# Patient Record
Sex: FEMALE | Race: WHITE | Employment: FULL TIME | URBAN - NONMETROPOLITAN AREA
[De-identification: names, ages, dates, MRNs, and addresses within clinical notes are randomized per-mention and may not be internally consistent; named-entity substitution may affect disease eponyms.]

---

## 2017-11-16 ENCOUNTER — APPOINTMENT (OUTPATIENT)
Dept: GENERAL RADIOLOGY | Age: 27
End: 2017-11-16
Payer: COMMERCIAL

## 2017-11-16 ENCOUNTER — HOSPITAL ENCOUNTER (EMERGENCY)
Age: 27
Discharge: HOME | End: 2017-11-16
Attending: EMERGENCY MEDICINE
Payer: COMMERCIAL

## 2017-11-16 VITALS
HEART RATE: 101 BPM | TEMPERATURE: 98 F | OXYGEN SATURATION: 100 % | SYSTOLIC BLOOD PRESSURE: 135 MMHG | DIASTOLIC BLOOD PRESSURE: 81 MMHG | RESPIRATION RATE: 18 BRPM

## 2017-11-16 DIAGNOSIS — S29.012A RHOMBOID MUSCLE STRAIN, INITIAL ENCOUNTER: Primary | ICD-10-CM

## 2017-11-16 PROCEDURE — 99283 EMERGENCY DEPT VISIT LOW MDM: CPT

## 2017-11-16 PROCEDURE — 73030 X-RAY EXAM OF SHOULDER: CPT

## 2017-11-16 PROCEDURE — 6370000000 HC RX 637 (ALT 250 FOR IP): Performed by: EMERGENCY MEDICINE

## 2017-11-16 RX ORDER — CYCLOBENZAPRINE HCL 10 MG
10 TABLET ORAL ONCE
Status: COMPLETED | OUTPATIENT
Start: 2017-11-16 | End: 2017-11-16

## 2017-11-16 RX ORDER — ACETAMINOPHEN 325 MG/1
975 TABLET ORAL ONCE
Status: COMPLETED | OUTPATIENT
Start: 2017-11-16 | End: 2017-11-16

## 2017-11-16 RX ORDER — CYCLOBENZAPRINE HCL 10 MG
10 TABLET ORAL 3 TIMES DAILY PRN
Qty: 21 TABLET | Refills: 0 | Status: SHIPPED | OUTPATIENT
Start: 2017-11-16 | End: 2017-11-23

## 2017-11-16 RX ORDER — TRAMADOL HYDROCHLORIDE 50 MG/1
50 TABLET ORAL ONCE
Status: COMPLETED | OUTPATIENT
Start: 2017-11-16 | End: 2017-11-16

## 2017-11-16 RX ORDER — TRAMADOL HYDROCHLORIDE 50 MG/1
50 TABLET ORAL EVERY 6 HOURS PRN
Qty: 20 TABLET | Refills: 0 | Status: SHIPPED | OUTPATIENT
Start: 2017-11-16 | End: 2017-11-26

## 2017-11-16 RX ADMIN — CYCLOBENZAPRINE HYDROCHLORIDE 10 MG: 10 TABLET, FILM COATED ORAL at 20:49

## 2017-11-16 RX ADMIN — TRAMADOL HYDROCHLORIDE 50 MG: 50 TABLET, FILM COATED ORAL at 20:49

## 2017-11-16 RX ADMIN — ACETAMINOPHEN 975 MG: 325 TABLET ORAL at 20:49

## 2017-11-16 ASSESSMENT — PAIN DESCRIPTION - PAIN TYPE: TYPE: ACUTE PAIN

## 2017-11-16 ASSESSMENT — PAIN DESCRIPTION - LOCATION: LOCATION: SHOULDER

## 2017-11-16 ASSESSMENT — PAIN SCALES - GENERAL
PAINLEVEL_OUTOF10: 7
PAINLEVEL_OUTOF10: 7

## 2017-11-16 ASSESSMENT — PAIN DESCRIPTION - ORIENTATION: ORIENTATION: LEFT

## 2017-11-17 ASSESSMENT — ENCOUNTER SYMPTOMS
COUGH: 0
WHEEZING: 0
SHORTNESS OF BREATH: 0

## 2017-11-17 NOTE — ED PROVIDER NOTES
Perfecto on 11/16/2017 8:09 PM              Vitals:    Vitals:    11/16/17 2035   BP: 135/81   Pulse: 101   Resp: 18   Temp: 98 °F (36.7 °C)   SpO2: 100%       EMERGENCY DEPARTMENT COURSE:    She was given Tylenol, Flexeril, tramadol. Test results and plan of care discussed. FINAL IMPRESSION      1.  Rhomboid muscle strain, initial encounter        DISPOSITION/PLAN    DISPOSITION Decision to Discharge    PATIENT REFERRED TO:    your Workman's [employer] provider in [facility]    Schedule an appointment as soon as possible for a visit         DISCHARGE MEDICATIONS:    Discharge Medication List as of 11/16/2017  8:40 PM      START taking these medications    Details   cyclobenzaprine (FLEXERIL) 10 MG tablet Take 1 tablet by mouth 3 times daily as needed for Muscle spasms, Disp-21 tablet, R-0Print      traMADol (ULTRAM) 50 MG tablet Take 1 tablet by mouth every 6 hours as needed for Pain ., Disp-20 tablet, R-0Print                (Please note that portions of this note were completed with a voice recognition program.  Efforts were made to edit the dictations but occasionally words are mis-transcribed.)      Sundeep Ruiz MD  11/17/17 1362

## 2017-11-17 NOTE — ED NOTES
Patient to exam room 4 via triage with c/o left shoulder pain while at work. Patient denies an injury to the shoulder, but thinks it might be from overuse.       Jm Stringer RN  11/16/17 3830

## 2018-03-22 ENCOUNTER — HOSPITAL ENCOUNTER (EMERGENCY)
Age: 28
Discharge: HOME | End: 2018-03-22
Attending: NURSE PRACTITIONER
Payer: COMMERCIAL

## 2018-03-22 VITALS
OXYGEN SATURATION: 100 % | DIASTOLIC BLOOD PRESSURE: 94 MMHG | RESPIRATION RATE: 18 BRPM | WEIGHT: 230 LBS | TEMPERATURE: 98.4 F | HEIGHT: 66 IN | HEART RATE: 112 BPM | SYSTOLIC BLOOD PRESSURE: 138 MMHG | BODY MASS INDEX: 36.96 KG/M2

## 2018-03-22 DIAGNOSIS — M25.561 CHRONIC PAIN OF RIGHT KNEE: Primary | ICD-10-CM

## 2018-03-22 DIAGNOSIS — G89.29 CHRONIC PAIN OF RIGHT KNEE: Primary | ICD-10-CM

## 2018-03-22 PROCEDURE — 99212 OFFICE O/P EST SF 10 MIN: CPT

## 2018-03-22 PROCEDURE — 99213 OFFICE O/P EST LOW 20 MIN: CPT | Performed by: NURSE PRACTITIONER

## 2018-03-22 ASSESSMENT — PAIN DESCRIPTION - PAIN TYPE: TYPE: ACUTE PAIN

## 2018-03-22 ASSESSMENT — PAIN DESCRIPTION - DESCRIPTORS: DESCRIPTORS: DULL;SHOOTING

## 2018-03-22 ASSESSMENT — PAIN SCALES - GENERAL: PAINLEVEL_OUTOF10: 2

## 2018-03-22 ASSESSMENT — ENCOUNTER SYMPTOMS: COLOR CHANGE: 0

## 2018-03-22 ASSESSMENT — PAIN DESCRIPTION - FREQUENCY: FREQUENCY: CONTINUOUS

## 2018-03-22 ASSESSMENT — PAIN DESCRIPTION - ORIENTATION: ORIENTATION: RIGHT

## 2018-03-22 ASSESSMENT — PAIN DESCRIPTION - ONSET: ONSET: ON-GOING

## 2018-03-22 ASSESSMENT — PAIN DESCRIPTION - LOCATION: LOCATION: KNEE

## 2018-03-22 NOTE — ED NOTES
PT GIVEN DISCHARGE INSTRUCTIONS, VERBALIZES UNDERSTANDING. PT ASSESSMENT UNCHANGED, DISCHARGED IN STABLE CONDITION.         Prateek Tellez RN  03/22/18 4810

## 2018-05-07 ENCOUNTER — HOSPITAL ENCOUNTER (EMERGENCY)
Age: 28
Discharge: HOME | End: 2018-05-07
Payer: COMMERCIAL

## 2018-05-07 VITALS
HEIGHT: 66 IN | SYSTOLIC BLOOD PRESSURE: 161 MMHG | BODY MASS INDEX: 37.28 KG/M2 | TEMPERATURE: 97.9 F | RESPIRATION RATE: 16 BRPM | WEIGHT: 232 LBS | HEART RATE: 96 BPM | OXYGEN SATURATION: 100 % | DIASTOLIC BLOOD PRESSURE: 94 MMHG

## 2018-05-07 DIAGNOSIS — K52.9 GASTROENTERITIS: Primary | ICD-10-CM

## 2018-05-07 PROCEDURE — 6360000002 HC RX W HCPCS: Performed by: NURSE PRACTITIONER

## 2018-05-07 PROCEDURE — 99213 OFFICE O/P EST LOW 20 MIN: CPT | Performed by: NURSE PRACTITIONER

## 2018-05-07 PROCEDURE — 99212 OFFICE O/P EST SF 10 MIN: CPT

## 2018-05-07 RX ORDER — ONDANSETRON 4 MG/1
4 TABLET, ORALLY DISINTEGRATING ORAL EVERY 8 HOURS PRN
Qty: 6 TABLET | Refills: 0 | Status: SHIPPED | OUTPATIENT
Start: 2018-05-07 | End: 2019-09-28

## 2018-05-07 RX ORDER — ONDANSETRON 4 MG/1
4 TABLET, ORALLY DISINTEGRATING ORAL ONCE
Status: COMPLETED | OUTPATIENT
Start: 2018-05-07 | End: 2018-05-07

## 2018-05-07 RX ORDER — ONDANSETRON HYDROCHLORIDE 4 MG/5ML
4 SOLUTION ORAL ONCE
COMMUNITY
End: 2019-09-28

## 2018-05-07 RX ADMIN — ONDANSETRON 4 MG: 4 TABLET, ORALLY DISINTEGRATING ORAL at 18:32

## 2018-05-07 ASSESSMENT — ENCOUNTER SYMPTOMS
COLOR CHANGE: 0
COUGH: 0
DIARRHEA: 0
VOMITING: 1
ANAL BLEEDING: 0
NAUSEA: 1
ABDOMINAL PAIN: 0
RHINORRHEA: 0
SINUS PRESSURE: 0
SORE THROAT: 0
SHORTNESS OF BREATH: 0

## 2019-09-28 ENCOUNTER — HOSPITAL ENCOUNTER (EMERGENCY)
Age: 29
Discharge: HOME | End: 2019-09-28
Payer: MEDICARE

## 2019-09-28 VITALS
DIASTOLIC BLOOD PRESSURE: 94 MMHG | HEART RATE: 97 BPM | RESPIRATION RATE: 18 BRPM | SYSTOLIC BLOOD PRESSURE: 123 MMHG | TEMPERATURE: 98.6 F

## 2019-09-28 DIAGNOSIS — M77.11 RIGHT LATERAL EPICONDYLITIS: Primary | ICD-10-CM

## 2019-09-28 DIAGNOSIS — S66.912A MUSCLE STRAIN OF LEFT WRIST, INITIAL ENCOUNTER: ICD-10-CM

## 2019-09-28 DIAGNOSIS — S29.012A STRAIN OF RHOMBOID MUSCLE, INITIAL ENCOUNTER: ICD-10-CM

## 2019-09-28 PROCEDURE — 99212 OFFICE O/P EST SF 10 MIN: CPT

## 2019-09-28 PROCEDURE — 99213 OFFICE O/P EST LOW 20 MIN: CPT | Performed by: NURSE PRACTITIONER

## 2019-09-28 RX ORDER — PREDNISONE 20 MG/1
40 TABLET ORAL DAILY
Qty: 10 TABLET | Refills: 0 | Status: SHIPPED | OUTPATIENT
Start: 2019-09-28 | End: 2019-10-03

## 2019-09-28 ASSESSMENT — PAIN SCALES - GENERAL: PAINLEVEL_OUTOF10: 9

## 2019-09-28 ASSESSMENT — PAIN DESCRIPTION - FREQUENCY: FREQUENCY: CONTINUOUS

## 2019-09-28 ASSESSMENT — PAIN DESCRIPTION - ORIENTATION: ORIENTATION: RIGHT;LEFT

## 2019-09-28 ASSESSMENT — ENCOUNTER SYMPTOMS
VOMITING: 0
NAUSEA: 0

## 2019-09-28 ASSESSMENT — PAIN DESCRIPTION - PAIN TYPE: TYPE: ACUTE PAIN

## 2019-09-28 ASSESSMENT — PAIN DESCRIPTION - DESCRIPTORS: DESCRIPTORS: SORE;ACHING

## 2019-09-28 ASSESSMENT — PAIN DESCRIPTION - LOCATION: LOCATION: ARM

## 2019-10-12 ENCOUNTER — HOSPITAL ENCOUNTER (EMERGENCY)
Age: 29
Discharge: HOME | End: 2019-10-12
Payer: MEDICARE

## 2019-10-12 VITALS
RESPIRATION RATE: 18 BRPM | HEART RATE: 121 BPM | BODY MASS INDEX: 39.37 KG/M2 | SYSTOLIC BLOOD PRESSURE: 120 MMHG | WEIGHT: 245 LBS | DIASTOLIC BLOOD PRESSURE: 74 MMHG | HEIGHT: 66 IN | OXYGEN SATURATION: 97 % | TEMPERATURE: 101.2 F

## 2019-10-12 DIAGNOSIS — E86.0 DEHYDRATION: Primary | ICD-10-CM

## 2019-10-12 DIAGNOSIS — R51.9 ACUTE NONINTRACTABLE HEADACHE, UNSPECIFIED HEADACHE TYPE: ICD-10-CM

## 2019-10-12 DIAGNOSIS — J06.9 UPPER RESPIRATORY TRACT INFECTION, UNSPECIFIED TYPE: ICD-10-CM

## 2019-10-12 DIAGNOSIS — J01.00 ACUTE NON-RECURRENT MAXILLARY SINUSITIS: ICD-10-CM

## 2019-10-12 LAB
BASOPHILS # BLD: 0.6 %
BASOPHILS ABSOLUTE: 0.1 THOU/MM3 (ref 0–0.1)
BUN, WHOLE BLOOD: 4 MG/DL (ref 8–26)
CHLORIDE, WHOLE BLOOD: 95 MEQ/L (ref 98–109)
CREAT SERPL-MCNC: 0.8 MG/DL (ref 0.5–1.2)
EOSINOPHIL # BLD: 0.6 %
EOSINOPHILS ABSOLUTE: 0.1 THOU/MM3 (ref 0–0.4)
FLU A ANTIGEN: NEGATIVE
FLU B ANTIGEN: NEGATIVE
GFR, ESTIMATED: > 90 ML/MIN/1.73M2
GLUCOSE, WHOLE BLOOD: 102 MG/DL (ref 70–108)
HCT VFR BLD CALC: 40.8 % (ref 37–47)
HEMOGLOBIN: 14.7 GM/DL (ref 12–16)
IMMATURE GRANS (ABS): 0.05 THOU/MM3 (ref 0–0.07)
IMMATURE GRANULOCYTES: 0.4 %
LYMPHOCYTES # BLD: 11.3 %
LYMPHOCYTES ABSOLUTE: 1.5 THOU/MM3 (ref 1–4.8)
MCH RBC QN AUTO: 29.3 PG (ref 27–31)
MCHC RBC AUTO-ENTMCNC: 36 GM/DL (ref 33–37)
MCV RBC AUTO: 81 FL (ref 81–99)
MONOCYTES # BLD: 13 %
MONOCYTES ABSOLUTE: 1.7 THOU/MM3 (ref 0.4–1.3)
NUCLEATED RED BLOOD CELLS: 0 /100 WBC
PDW BLD-RTO: 12.8 % (ref 11.5–14.5)
PLATELET # BLD: 332 THOU/MM3 (ref 130–400)
PMV BLD AUTO: 10.2 FL (ref 7.4–10.4)
POTASSIUM, WHOLE BLOOD: 3.7 MEQ/L (ref 3.5–4.9)
RBC # BLD: 5.01 MILL/MM3 (ref 4.2–5.4)
SEG NEUTROPHILS: 74.1 %
SEGMENTED NEUTROPHILS ABSOLUTE COUNT: 9.7 THOU/MM3 (ref 1.8–7.7)
SODIUM BLD-SCNC: 133 MEQ/L (ref 138–146)
TOTAL CO2, WHOLE BLOOD: 26 MEQ/L (ref 23–33)
WBC # BLD: 13.1 THOU/MM3 (ref 4.8–10.8)

## 2019-10-12 PROCEDURE — 82947 ASSAY GLUCOSE BLOOD QUANT: CPT

## 2019-10-12 PROCEDURE — 85025 COMPLETE CBC W/AUTO DIFF WBC: CPT

## 2019-10-12 PROCEDURE — 87804 INFLUENZA ASSAY W/OPTIC: CPT

## 2019-10-12 PROCEDURE — 84520 ASSAY OF UREA NITROGEN: CPT

## 2019-10-12 PROCEDURE — 82565 ASSAY OF CREATININE: CPT

## 2019-10-12 PROCEDURE — 6370000000 HC RX 637 (ALT 250 FOR IP): Performed by: NURSE PRACTITIONER

## 2019-10-12 PROCEDURE — 36415 COLL VENOUS BLD VENIPUNCTURE: CPT

## 2019-10-12 PROCEDURE — 80051 ELECTROLYTE PANEL: CPT

## 2019-10-12 PROCEDURE — 99213 OFFICE O/P EST LOW 20 MIN: CPT

## 2019-10-12 PROCEDURE — 99213 OFFICE O/P EST LOW 20 MIN: CPT | Performed by: NURSE PRACTITIONER

## 2019-10-12 RX ORDER — ACETAMINOPHEN 325 MG/1
650 TABLET ORAL ONCE
Status: COMPLETED | OUTPATIENT
Start: 2019-10-12 | End: 2019-10-12

## 2019-10-12 RX ORDER — ONDANSETRON 4 MG/1
4 TABLET, ORALLY DISINTEGRATING ORAL EVERY 8 HOURS PRN
Qty: 4 TABLET | Refills: 0 | Status: SHIPPED | OUTPATIENT
Start: 2019-10-12 | End: 2019-10-16

## 2019-10-12 RX ORDER — AMOXICILLIN AND CLAVULANATE POTASSIUM 875; 125 MG/1; MG/1
1 TABLET, FILM COATED ORAL 2 TIMES DAILY
Qty: 20 TABLET | Refills: 0 | Status: SHIPPED | OUTPATIENT
Start: 2019-10-12 | End: 2019-10-22

## 2019-10-12 RX ORDER — KETOROLAC TROMETHAMINE 30 MG/ML
30 INJECTION, SOLUTION INTRAMUSCULAR; INTRAVENOUS ONCE
Status: DISCONTINUED | OUTPATIENT
Start: 2019-10-12 | End: 2019-10-12

## 2019-10-12 RX ADMIN — ACETAMINOPHEN 650 MG: 325 TABLET ORAL at 18:12

## 2019-10-12 ASSESSMENT — ENCOUNTER SYMPTOMS
SHORTNESS OF BREATH: 0
CHEST TIGHTNESS: 0
NAUSEA: 1
WHEEZING: 0
COLOR CHANGE: 0
BACK PAIN: 0
STRIDOR: 0
VOMITING: 1
COUGH: 0

## 2019-10-12 ASSESSMENT — PAIN DESCRIPTION - PROGRESSION: CLINICAL_PROGRESSION: NOT CHANGED

## 2019-10-12 ASSESSMENT — PAIN DESCRIPTION - PAIN TYPE: TYPE: ACUTE PAIN

## 2019-10-12 ASSESSMENT — PAIN - FUNCTIONAL ASSESSMENT: PAIN_FUNCTIONAL_ASSESSMENT: PREVENTS OR INTERFERES WITH ALL ACTIVE AND SOME PASSIVE ACTIVITIES

## 2019-10-12 ASSESSMENT — PAIN DESCRIPTION - ONSET: ONSET: ON-GOING

## 2019-10-12 ASSESSMENT — PAIN DESCRIPTION - LOCATION: LOCATION: HEAD

## 2019-10-12 ASSESSMENT — PAIN DESCRIPTION - DESCRIPTORS: DESCRIPTORS: HEADACHE;POUNDING

## 2019-10-12 ASSESSMENT — PAIN DESCRIPTION - FREQUENCY: FREQUENCY: CONTINUOUS

## 2019-10-12 ASSESSMENT — PAIN SCALES - GENERAL: PAINLEVEL_OUTOF10: 7

## 2021-05-16 ENCOUNTER — HOSPITAL ENCOUNTER (EMERGENCY)
Age: 31
Discharge: HOME | End: 2021-05-16
Attending: EMERGENCY MEDICINE
Payer: COMMERCIAL

## 2021-05-16 ENCOUNTER — APPOINTMENT (OUTPATIENT)
Dept: GENERAL RADIOLOGY | Age: 31
End: 2021-05-16
Payer: COMMERCIAL

## 2021-05-16 VITALS
SYSTOLIC BLOOD PRESSURE: 151 MMHG | DIASTOLIC BLOOD PRESSURE: 96 MMHG | OXYGEN SATURATION: 100 % | TEMPERATURE: 98 F | RESPIRATION RATE: 18 BRPM | HEART RATE: 83 BPM

## 2021-05-16 DIAGNOSIS — S83.91XA SPRAIN OF RIGHT KNEE, UNSPECIFIED LIGAMENT, INITIAL ENCOUNTER: Primary | ICD-10-CM

## 2021-05-16 DIAGNOSIS — S83.92XA SPRAIN OF LEFT KNEE, UNSPECIFIED LIGAMENT, INITIAL ENCOUNTER: ICD-10-CM

## 2021-05-16 PROCEDURE — 99282 EMERGENCY DEPT VISIT SF MDM: CPT

## 2021-05-16 PROCEDURE — 73564 X-RAY EXAM KNEE 4 OR MORE: CPT

## 2021-05-16 RX ORDER — IBUPROFEN 800 MG/1
800 TABLET ORAL EVERY 8 HOURS PRN
Qty: 30 TABLET | Refills: 0 | Status: SHIPPED | OUTPATIENT
Start: 2021-05-16 | End: 2021-10-26

## 2021-05-16 ASSESSMENT — ENCOUNTER SYMPTOMS
SINUS PRESSURE: 0
COUGH: 0
TROUBLE SWALLOWING: 0
DIARRHEA: 0
CONSTIPATION: 0
SORE THROAT: 0
WHEEZING: 0
CHEST TIGHTNESS: 0
VOICE CHANGE: 0
NAUSEA: 0
BACK PAIN: 0
SHORTNESS OF BREATH: 0
RHINORRHEA: 0
ABDOMINAL PAIN: 0
VOMITING: 0

## 2021-05-16 ASSESSMENT — PAIN DESCRIPTION - DESCRIPTORS: DESCRIPTORS: SHARP

## 2021-05-16 ASSESSMENT — PAIN DESCRIPTION - LOCATION: LOCATION: KNEE

## 2021-05-16 ASSESSMENT — PAIN DESCRIPTION - PAIN TYPE: TYPE: ACUTE PAIN

## 2021-05-16 NOTE — ED TRIAGE NOTES
Pt comes in through ED lobby. She states that at work at [employer] she jumped off a 4 ft ledge and landed on her feet. Since then she has had severe right knee pain. She has some pain in her left knee, but most the pain is in her right.

## 2021-05-16 NOTE — ED PROVIDER NOTES
PO) Take by mouthHistorical Med      MedroxyPROGESTERone Acetate (DEPO-PROVERA IM) Inject into the muscleHistorical Med      acetaminophen (APAP EXTRA STRENGTH) 500 MG tablet Take 2 tablets by mouth 4 times daily as needed for Pain or Fever, Disp-30 tablet, R-0             ALLERGIES    is allergic to norco [hydrocodone-acetaminophen], nsaids, and vicodin [hydrocodone-acetaminophen]. FAMILY HISTORY    She indicated that her mother is alive. She indicated that her father is alive. family history includes Diabetes in her father; Heart Disease in her father; High Blood Pressure in her father; Other in her mother. SOCIAL HISTORY     reports that she has quit smoking. Her smoking use included cigarettes. She has a 6.50 pack-year smoking history. She has never used smokeless tobacco. She reports current alcohol use. She reports that she does not use drugs. PHYSICAL EXAM      INITIAL VITALS: BP (!) 151/96   Pulse 83   Temp 98 °F (36.7 °C) (Oral)   Resp 18   SpO2 100%  Estimated body mass index is 39.54 kg/m² as calculated from the following:    Height as of 10/12/19: 5' 6\" (1.676 m). Weight as of 10/12/19: 245 lb (111.1 kg). Physical Exam  Vitals reviewed. Constitutional:       Appearance: She is well-developed. HENT:      Head: Normocephalic and atraumatic. Right Ear: External ear normal.      Left Ear: External ear normal.      Nose: Nose normal.   Eyes:      General: No scleral icterus. Conjunctiva/sclera: Conjunctivae normal.      Pupils: Pupils are equal, round, and reactive to light. Neck:      Thyroid: No thyromegaly. Vascular: No JVD. Cardiovascular:      Rate and Rhythm: Normal rate and regular rhythm. Heart sounds: No murmur heard. No friction rub. Pulmonary:      Effort: Pulmonary effort is normal.      Breath sounds: Normal breath sounds. No wheezing or rales. Chest:      Chest wall: No tenderness.    Abdominal:      General: Bowel sounds are normal. Palpations: Abdomen is soft. There is no mass. Tenderness: There is no abdominal tenderness. Musculoskeletal:         General: Tenderness (Right knee showed no deformity however there is a diffuse tenderness on the anterior medial and lateral aspect with positive Joshua test.  Left knee showed negative Joshua test however there is tenderness mild leg on the anterior and anterolateral) present. Cervical back: Normal range of motion and neck supple. Lymphadenopathy:      Cervical: No cervical adenopathy. Skin:     Findings: No rash. Neurological:      Mental Status: She is alert and oriented to person, place, and time. Psychiatric:         Behavior: Behavior is cooperative. MEDICAL DECISION MAKING    DIFFERENTIAL DIAGNOSIS:  Lateral knee sprain right more than the left fracture dislocation, history of knee ACL surgery right      DIAGNOSTIC RESULTS    RADIOLOGY:  I have reviewedradiologic plain film image(s). The plain films will be read or overread by the radiologist.  All other non-plain film images(s) such as CT, Ultrasound and MRI have been read by the radiologist.  XR KNEE RIGHT (MIN 4 VIEWS)   Final Result   1. No acute fracture or malalignment is demonstrated. However there is a small right knee joint effusion suggested. **This report has been created using voice recognition software. It may contain minor errors which are inherent in voice recognition technology. **      Final report electronically signed by Dr. Kathleen Pete on 5/16/2021 3:43 PM            Vitals:    Vitals:    05/16/21 1502   BP: (!) 151/96   Pulse: 83   Resp: 18   Temp: 98 °F (36.7 °C)   TempSrc: Oral   SpO2: 100%       EMERGENCY DEPARTMENT COURSE:    Medications - No data to display    The pt was seen and evaluated by me. Within the department, I observed the pt's vitalsigns to be within acceptable range. Radiological studies were performed, results were reviewed with the patient.  Within the department, the pt was treated with knee immobilizer and crutches. I observed the pt's condition to be hemodynamically stable during the duration of their stay. I explained my proposed course of treatment to the pt, and they were amenable to my decision. They were discharged home, and they will return to the ED if their symptoms become more severein nature, or otherwise change. Controlled Substances Monitoring:        CRITICAL CARE:   None. CONSULTS:  None    PROCEDURES:  None. FINAL IMPRESSION       1. Sprain of right knee, unspecified ligament, initial encounter    2. Sprain of left knee, unspecified ligament, initial encounter          DISPOSITION/PLAN  PATIENT REFERRED TO:  [place]  [address] [facility] [address]  [phone]  Schedule an appointment as soon as possible for a visit in 1 day      DISCHARGEMEDICATIONS:  Discharge Medication List as of 5/16/2021  4:19 PM      START taking these medications    Details   ibuprofen (IBU) 800 MG tablet Take 1 tablet by mouth every 8 hours as needed for Pain, Disp-30 tablet, R-0Print               (Please note that portions of this note were completed with a voice recognition program and electronically transcribed. Efforts were [facility] edit the dictations but occasionally words are mis-transcribed . The transcription may contain errors not detected in proofreading.   This transcription was electronically signed.)         05/16/21 5:59 PM      Krissy Bolaños MD      Emergency room physician           Krissy Bolaños MD  05/16/21 1800

## 2021-07-26 ENCOUNTER — HOSPITAL ENCOUNTER (OUTPATIENT)
Dept: GENERAL RADIOLOGY | Age: 31
Discharge: HOME | End: 2021-07-26
Payer: COMMERCIAL

## 2021-07-26 ENCOUNTER — HOSPITAL ENCOUNTER (OUTPATIENT)
Age: 31
Discharge: HOME | End: 2021-07-26
Payer: COMMERCIAL

## 2021-07-26 DIAGNOSIS — Z01.811 PRE-OP CHEST EXAM: ICD-10-CM

## 2021-07-26 DIAGNOSIS — S82.125A CLOSED NONDISPLACED FRACTURE OF LATERAL CONDYLE OF LEFT TIBIA, INITIAL ENCOUNTER: ICD-10-CM

## 2021-07-26 LAB
EKG ATRIAL RATE: 95 BPM
EKG P AXIS: 33 DEGREES
EKG P-R INTERVAL: 124 MS
EKG Q-T INTERVAL: 358 MS
EKG QRS DURATION: 78 MS
EKG QTC CALCULATION (BAZETT): 449 MS
EKG R AXIS: 13 DEGREES
EKG T AXIS: 34 DEGREES
EKG VENTRICULAR RATE: 95 BPM
ERYTHROCYTE [DISTWIDTH] IN BLOOD BY AUTOMATED COUNT: 12.1 % (ref 11.5–14.5)
ERYTHROCYTE [DISTWIDTH] IN BLOOD BY AUTOMATED COUNT: 38.6 FL (ref 35–45)
HCT VFR BLD CALC: 46.9 % (ref 37–47)
HEMOGLOBIN: 15.4 GM/DL (ref 12–16)
MCH RBC QN AUTO: 28.7 PG (ref 26–33)
MCHC RBC AUTO-ENTMCNC: 32.8 GM/DL (ref 32.2–35.5)
MCV RBC AUTO: 87.5 FL (ref 81–99)
PLATELET # BLD: 333 THOU/MM3 (ref 130–400)
PMV BLD AUTO: 10 FL (ref 9.4–12.4)
RBC # BLD: 5.36 MILL/MM3 (ref 4.2–5.4)
WBC # BLD: 8.1 THOU/MM3 (ref 4.8–10.8)

## 2021-07-26 PROCEDURE — 71046 X-RAY EXAM CHEST 2 VIEWS: CPT

## 2021-07-26 PROCEDURE — 93005 ELECTROCARDIOGRAM TRACING: CPT | Performed by: ORTHOPAEDIC SURGERY

## 2021-07-26 PROCEDURE — 85027 COMPLETE CBC AUTOMATED: CPT

## 2021-07-26 PROCEDURE — 80053 COMPREHEN METABOLIC PANEL: CPT

## 2021-07-26 PROCEDURE — 36415 COLL VENOUS BLD VENIPUNCTURE: CPT

## 2021-07-27 LAB
ALBUMIN SERPL-MCNC: 4.3 G/DL (ref 3.5–5.1)
ALP BLD-CCNC: 63 U/L (ref 38–126)
ALT SERPL-CCNC: 11 U/L (ref 11–66)
ANION GAP SERPL CALCULATED.3IONS-SCNC: 13 MEQ/L (ref 8–16)
AST SERPL-CCNC: 12 U/L (ref 5–40)
BILIRUB SERPL-MCNC: 0.4 MG/DL (ref 0.3–1.2)
BUN BLDV-MCNC: 10 MG/DL (ref 7–22)
CALCIUM SERPL-MCNC: 8.9 MG/DL (ref 8.5–10.5)
CHLORIDE BLD-SCNC: 103 MEQ/L (ref 98–111)
CO2: 23 MEQ/L (ref 23–33)
CREAT SERPL-MCNC: 0.8 MG/DL (ref 0.4–1.2)
GFR SERPL CREATININE-BSD FRML MDRD: 84 ML/MIN/1.73M2
GLUCOSE BLD-MCNC: 78 MG/DL (ref 70–108)
POTASSIUM SERPL-SCNC: 4 MEQ/L (ref 3.5–5.2)
SODIUM BLD-SCNC: 139 MEQ/L (ref 135–145)
TOTAL PROTEIN: 7.1 G/DL (ref 6.1–8)

## 2021-08-09 ENCOUNTER — HOSPITAL ENCOUNTER (OUTPATIENT)
Dept: PHYSICAL THERAPY | Age: 31
Setting detail: THERAPIES SERIES
Discharge: HOME | End: 2021-08-09
Payer: COMMERCIAL

## 2021-08-09 PROCEDURE — 97110 THERAPEUTIC EXERCISES: CPT

## 2021-08-09 PROCEDURE — 97162 PT EVAL MOD COMPLEX 30 MIN: CPT

## 2021-08-09 NOTE — PROGRESS NOTES
** PLEASE SIGN, DATE AND TIME CERTIFICATION BELOW AND RETURN TO [facility] OUTPATIENT REHABILITATION (FAX #: [phone]). ATTEST/CO-SIGN IF ACCESSING VIA INWidgetbox. THANK YOU.**    I certify that I have examined the patient below and determined that Physical Medicine and Rehabilitation service is necessary and that I approve the established plan of care for up to 90 days or as specifically noted. Attestation, signature or co-signature of physician indicates approval of certification requirements.    ________________________ ____________ __________  Physician Signature   Date   Time   [address] [facility]  PHYSICAL THERAPY  [x] EVALUATION  [] DAILY NOTE (LAND) [] DAILY NOTE (AQUATIC ) [] PROGRESS NOTE [] DISCHARGE NOTE    [] [address] [facility]   [x] [place] [address]    [] [facility]   [] [facility]    Date: 2021  Patient Name:  Merian Kayser  : 1990  MRN: [id number]  CSN: [id number]    Referring Practitioner Grecia Aguilar MD   Diagnosis Sprain of anterior cruciate ligament of right knee, initial encounter [S83.511A]  Other tear of medial meniscus, current injury, right knee, initial encounter [S83.241A]  Tear of articular cartilage of right knee, current, initial encounter Darling Temi    Treatment Diagnosis S83.241A, S83.511A, S83.31XA   Date of Evaluation 21   Additional Pertinent History R ACL reconstruction 2006 Dr. Amina Garzon. 2nd ACL reconstruction and meniscus repair on 21 by Dr. Radha Ogden.       Functional Outcome Measure Used LEFS    Functional Outcome Score eval score 3 (21)       Insurance: Primary: Payor: Estella Friedman /  /  / ,   Secondary:    Authorization Information: 12 visits, pays at 100%, - 10/01   Visit # 1, 1/10 for progress note   Visits Allowed: 12   Recertification Date:    Physician Follow-Up:    Physician Orders:    History of Present Illness:      SUBJECTIVE: May 16, 2021 at work  jumped off shelf with severe R knee pain, unable to walk with R LE, mom came to  from work and to ED,x-rays done L knee negative , discharged with hinged knee brace and crutches. Also had L knee pain later that day. Called [facility] and went to walk in clinic the next day, x-rays to both knees, did not seen any fractures. MRI requested for both knees from [facility], 1 week later had MRI's B knee, ACL and meniscus tear R knee, fracture of L knee but no surgery needed. R knee ACL reconstruction and meniscus repair on 8/5/21, home the same day with TROM brace. Patient is NWB x 6 weeks,     Social/Functional History and Current Status:  Medications and Allergies have been reviewed and are listed on Medical History Questionnaire. Lopez Scott lives with family in a single story home with stairs and a handrail to enter. Task Previous Current   ADLs  Independent Assistance Required   IADL's Independent Assistance Required   Ambulation Independent Modified Independent   Transfers Independent Modified Independent   Recreation Independent Dependent/Unable   Community Integration Independent Dependent/Unable   Driving Active  Active  unable to drive since surgery   Work Full-Time.   Occupation:  at [employer], up and down ladders, lifting, standing, walking, squatting  On medical leave until at leave 10/6/21       OBJECTIVE:  Pain R knee pain 100% of the day, high 10/10, average 5/10       Observation 2 ACE wraps, tan JOSE MARIA hose borrowed from family as discharge nurse forgot, 3 incision, tape sealing guaze so PT removed, no drainage noted on incisions   Posture fair        Range of Motion Hip: R hip flexion max assist SLR, hip flexion 60  Knee: knee R 15- 50 degrees, L knee 0- 125 degrees  Ankle: ankle DF 5 degrees B   Strength Right Lower Extremity:  Impaired - R hip and knee 3-/5, ankle 4-/5  Left Lower Extremity:   [place] 5/5 L knee   Coordination Impaired - slow toe tapping R   Sensation WFL   Bed Mobility report able to walk with normal gait pattern with no pain  2. Increase strength R LE to 4/5 to allow patient to go up and down steps reciprocally with no pain  3. I with HEP as prescribed to allow patient to walk x 30 minutes at grocery with no R knee pain    Patient Education:   [x]  HEP/Education Completed: Plan of Care, Goals, HEP of above exercises, also advised patient to contact MD as nurse \"forgot\" JOSE MARIA hose at discharge from  Hospital and using multiple ACE wraps and sealing gauze with excess tape   Medbridge Access Code:  []  No new Education completed  []  Reviewed Prior HEP      []  Patient verbalized and/or demonstrated understanding of education provided. []  Patient unable to verbalize and/or demonstrate understanding of education provided. Will continue education. [x]  Barriers to learning: none    PLAN:  Treatment Recommendations: Strengthening, Range of Motion, Balance Training, Gait Training, Stair Training, Home Exercise Program and Patient Education    [x]  Plan of care initiated. Plan to see patient 2 times per week for 12 weeks to address the treatment planned outlined above.   []  Continue with current plan of care  []  Modify plan of care as follows:    []  Hold pending physician visit  []  Discharge    Time In 1355   Time Out 1445   Timed Code Minutes: 25 min   Total Treatment Time: 50 min       Electronically Signed by: Arnoldo Noland, PT

## 2021-08-11 ENCOUNTER — HOSPITAL ENCOUNTER (OUTPATIENT)
Dept: PHYSICAL THERAPY | Age: 31
Setting detail: THERAPIES SERIES
Discharge: HOME | End: 2021-08-11
Payer: COMMERCIAL

## 2021-08-11 PROCEDURE — 97110 THERAPEUTIC EXERCISES: CPT

## 2021-08-11 NOTE — PROGRESS NOTES
[address] [facility]  PHYSICAL THERAPY  [] EVALUATION  [x] DAILY NOTE (LAND) [] DAILY NOTE (AQUATIC ) [] PROGRESS NOTE [] DISCHARGE NOTE    [] [address] [facility]   [x] [facility]    [] [facility]   [] Dixon [facility]    Date: 2021  Patient Name:  Sheran Boast  : 1990  MRN: [id number]  CSN: [id number]    Referring Practitioner Karolyn Parra MD   Diagnosis Sprain of anterior cruciate ligament of right knee, initial encounter [S83.511A]  Other tear of medial meniscus, current injury, right knee, initial encounter [S83.241A]  Tear of articular cartilage of right knee, current, initial encounter Kayleen Boland    Treatment Diagnosis S83.241A, S83.511A, S83.31XA   Date of Evaluation 21   Additional Pertinent History R ACL reconstruction 2006 Dr. Keyshawn Garrett. 2nd ACL reconstruction and meniscus repair on 21 by Dr. Cindy Starks. Functional Outcome Measure Used LEFS    Functional Outcome Score eval score 3 (21)       Insurance: Primary: Payor: Martha Mosquera /  /  / ,   Secondary:    Authorization Information: 12 visits, pays at 100%, - 10/01   Visit # 2, 2/10 for progress note   Visits Allowed: 12   Recertification Date:    Physician Follow-Up:    Physician Orders:    History of Present Illness:      SUBJECTIVE:  Pt reports pain today 1-2/10.       OBJECTIVE:    TREATMENT   Precautions: NWB R LE , no flexion more than 90 degrees until 21   Pain: 1-2/10 R knee    X in shaded column indicates activity completed today   Modalities Parameters/  Location  Notes                     Manual Therapy Time/Technique  Notes                     Exercise/Intervention   Notes   Heel slide with strap 10  x    Quad set 10 5 sec x AROM R knee 5- 60 degrees   DF belt stretch 5 15 sec x    Sitting EOB knee flexion 3 min  x    SLR 3 way 2x5  x Hip flex, abd          bike                                     Specific Interventions Next Treatment: NWB R ACL protocol , no flexion > 90 degrees  X 4 weeks until 9/5/21. Activity/Treatment Tolerance:  [x]  Patient tolerated treatment well  []  Patient limited by fatigue  []  Patient limited by pain   []  Patient limited by medical complications  []  Other:     Assessment: Pt tolerated session fairly well, increased pain noted with side lying SLR. Plan to include stationary bike next visit. GOALS:  Patient Goal: to get my knee back to normal    Short Term Goals:  Time Frame: 4 weeks  1. Increase AROM R hip flexion to 40, knee 0- 90 degrees to allow patient to report able to get left knee in/out of car without difficulty  2. Increase strength R LE to 3+/5 to allow patient to get right leg in/out of car and bed without using arms  3. I with HEP as prescribed to allow patient to report able to sleep x 4 hours without awakening due to R knee pain    Long Term Goals:  Time Frame: 12 weeks  1. Increase AROM R hip flexion to 60, knee 0- 125 degrees to allow patient to report able to walk with normal gait pattern with no pain  2. Increase strength R LE to 4/5 to allow patient to go up and down steps reciprocally with no pain  3. I with HEP as prescribed to allow patient to walk x 30 minutes at grocery with no R knee pain    Patient Education:   []  HEP/Education Completed: Plan of Care, Goals, HEP of above exercises, also advised patient to contact MD as nurse \"forgot\" JOSE MARIA hose at discharge from  Hospital and using multiple ACE wraps and sealing gauze with excess tape   Medbridge Access Code:  []  No new Education completed  [x]  Reviewed Prior HEP      [x]  Patient verbalized and/or demonstrated understanding of education provided. []  Patient unable to verbalize and/or demonstrate understanding of education provided. Will continue education.   [x]  Barriers to learning: none    PLAN:  Treatment Recommendations: Strengthening, Range of Motion, Balance Training, Gait Training, Stair Training, Home Exercise Program and Patient Education    []  Plan of care initiated. Plan to see patient 2 times per week for 12 weeks to address the treatment planned outlined above.   [x]  Continue with current plan of care  []  Modify plan of care as follows:    []  Hold pending physician visit  []  Discharge    Time In 1135   Time Out 1200   Timed Code Minutes: 25 min   Total Treatment Time: 25 min       Electronically Signed by: Derek Salazar, PTA [id number]

## 2021-08-16 ENCOUNTER — HOSPITAL ENCOUNTER (OUTPATIENT)
Dept: PHYSICAL THERAPY | Age: 31
Setting detail: THERAPIES SERIES
Discharge: HOME | End: 2021-08-16
Payer: COMMERCIAL

## 2021-08-16 PROCEDURE — 97110 THERAPEUTIC EXERCISES: CPT

## 2021-08-16 NOTE — PROGRESS NOTES
[address] [facility]  PHYSICAL THERAPY  [] EVALUATION  [x] DAILY NOTE (LAND) [] DAILY NOTE (AQUATIC ) [] PROGRESS NOTE [] DISCHARGE NOTE    [] [address] [place] [address]   [x] [place] [address]    [] [facility]   [] Terance Snellen    Date: 2021  Patient Name:  Fran Dolan  : 1990  MRN: [id number]  CSN: [id number]    Referring Practitioner Jaime Osman MD   Diagnosis Sprain of anterior cruciate ligament of right knee, initial encounter [Y35.941Z]  Other tear of medial meniscus, current injury, right knee, initial encounter [S83.241A]  Tear of articular cartilage of right knee, current, initial encounter Demarcus Wen    Treatment Diagnosis S83.241A, S83.511A, S83.31XA   Date of Evaluation 21   Additional Pertinent History R ACL reconstruction 2006 Dr. Reid Villagomez. 2nd ACL reconstruction and meniscus repair on 21 by Dr. Karissa Monte.       Functional Outcome Measure Used LEFS    Functional Outcome Score eval score 3 (21)       Insurance: Primary: Payor: Antonette Stephens /  /  / ,   Secondary:    Authorization Information: 12 visits, pays at 100%, - 10/01   Visit # 3, 3/10 for progress note   Visits Allowed: 12   Recertification Date:    Physician Follow-Up:    Physician Orders:    History of Present Illness:      SUBJECTIVE:  Patient reports ran out of pain meds this weekend, called MD and is awaiting call back      OBJECTIVE:    TREATMENT   Precautions: NWB R LE , no flexion more than 90 degrees until 21   Pain: 2/10 R knee    X in shaded column indicates activity completed today   Modalities Parameters/  Location  Notes                     Manual Therapy Time/Technique  Notes                     Exercise/Intervention   Notes   Heel slide with strap 15  x    Quad set 10 5 sec x AROM R knee 3- 70 degrees   DF belt stretch 5 15 sec x    Sitting EOB knee flexion 3 min  x    SLR 3 way 15 5 x Hip flex, abd          bike Specific Interventions Next Treatment: NWB R ACL protocol , no flexion > 90 degrees  X 4 weeks until 9/5/21. Activity/Treatment Tolerance:  [x]  Patient tolerated treatment well  []  Patient limited by fatigue  []  Patient limited by pain   []  Patient limited by medical complications  []  Other:     Assessment: progressed with exercises and hold, continues to progress well    GOALS:  Patient Goal: to get my knee back to normal    Short Term Goals:  Time Frame: 4 weeks  1. Increase AROM R hip flexion to 40, knee 0- 90 degrees to allow patient to report able to get left knee in/out of car without difficulty  2. Increase strength R LE to 3+/5 to allow patient to get right leg in/out of car and bed without using arms  3. I with HEP as prescribed to allow patient to report able to sleep x 4 hours without awakening due to R knee pain    Long Term Goals:  Time Frame: 12 weeks  1. Increase AROM R hip flexion to 60, knee 0- 125 degrees to allow patient to report able to walk with normal gait pattern with no pain  2. Increase strength R LE to 4/5 to allow patient to go up and down steps reciprocally with no pain  3. I with HEP as prescribed to allow patient to walk x 30 minutes at grocery with no R knee pain    Patient Education:    [x]  HEP/Education Completed: reviewed HEP reps with hold 5 seconds SLR flexion 15, side/stomach 10  []  No new Education completed  [x]  Reviewed Prior HEP      []  Patient verbalized and/or demonstrated understanding of education provided. []  Patient unable to verbalize and/or demonstrate understanding of education provided. Will continue education. [x]  Barriers to learning: none    PLAN:  Treatment Recommendations: Strengthening, Range of Motion, Balance Training, Gait Training, Stair Training, Home Exercise Program and Patient Education    []  Plan of care initiated.   Plan to see patient 2 times per week for 12 weeks to address the treatment planned outlined above.  [x]  Continue with current plan of care  []  Modify plan of care as follows:    []  Hold pending physician visit  []  Discharge    Time In 930   Time Out 1000   Timed Code Minutes: 30 min   Total Treatment Time: 30 min       Electronically Signed by: Timothy Robertson PT

## 2021-08-18 ENCOUNTER — HOSPITAL ENCOUNTER (OUTPATIENT)
Dept: PHYSICAL THERAPY | Age: 31
Setting detail: THERAPIES SERIES
Discharge: HOME | End: 2021-08-18
Payer: COMMERCIAL

## 2021-08-18 PROCEDURE — 97110 THERAPEUTIC EXERCISES: CPT

## 2021-08-18 NOTE — PROGRESS NOTES
[facility] [facility]  PHYSICAL THERAPY  [] EVALUATION  [x] DAILY NOTE (LAND) [] DAILY NOTE (AQUATIC ) [] PROGRESS NOTE [] DISCHARGE NOTE    [] 5 [facility]   [x] [facility]     [] [facility]   []     Date: 2021  Patient Name:  Barak Tirado  : 1990  MRN: [id number]  CSN: [id number]    Referring Practitioner Reginald Peña MD   Diagnosis Sprain of anterior cruciate ligament of right knee, initial encounter [S83.511A]  Other tear of medial meniscus, current injury, right knee, initial encounter [S83.241A]  Tear of articular cartilage of right knee, current, initial encounter Trevor Mcmahon    Treatment Diagnosis S83.241A, S83.511A, S83.31XA   Date of Evaluation 21   Additional Pertinent History R ACL reconstruction  Dr. Chandrakant Velasquez. 2nd ACL reconstruction and meniscus repair on 21 by Dr. Jasmyne Gillespie.       Functional Outcome Measure Used LEFS    Functional Outcome Score eval score 3 (21)       Insurance: Primary: Payor: Fadi Quick /  /  / ,   Secondary:    Authorization Information: 12 visits, pays at 100%, - 10/01   Visit # 4, 4/10 for progress note   Visits Allowed: 12   Recertification Date:    Physician Follow-Up:    Physician Orders:    History of Present Illness:      SUBJECTIVE:  Pain meds 3 x per day, doing well with exercises      OBJECTIVE:    TREATMENT   Precautions: NWB R LE , no flexion more than 90 degrees until 21   Pain: 3/10 R knee    X in shaded column indicates activity completed today   Modalities Parameters/  Location  Notes                     Manual Therapy Time/Technique  Notes                     Exercise/Intervention   Notes   Heel slide with strap 15  x    Quad set 10 5 sec x AROM R knee 0- 76 degrees   DF belt stretch 5 15 sec x    Sitting EOB knee flexion 3 min  x    SLR 3 way 15 5 x Hip flex, abd          Bike seat 2, min assist on/off step 3 minutes  x Specific Interventions Next Treatment: NWB R ACL protocol , no flexion > 90 degrees  X 4 weeks until 9/5/21. Activity/Treatment Tolerance:  [x]  Patient tolerated treatment well  []  Patient limited by fatigue  []  Patient limited by pain   []  Patient limited by medical complications  []  Other:     Assessment: progressed with exercises and hold, continues to progress well    GOALS:  Patient Goal: to get my knee back to normal    Short Term Goals:  Time Frame: 4 weeks  1. Increase AROM R hip flexion to 40, knee 0- 90 degrees to allow patient to report able to get left knee in/out of car without difficulty  2. Increase strength R LE to 3+/5 to allow patient to get right leg in/out of car and bed without using arms  3. I with HEP as prescribed to allow patient to report able to sleep x 4 hours without awakening due to R knee pain    Long Term Goals:  Time Frame: 12 weeks  1. Increase AROM R hip flexion to 60, knee 0- 125 degrees to allow patient to report able to walk with normal gait pattern with no pain  2. Increase strength R LE to 4/5 to allow patient to go up and down steps reciprocally with no pain  3. I with HEP as prescribed to allow patient to walk x 30 minutes at grocery with no R knee pain    Patient Education:    [x]  HEP/Education Completed: increased reps to 15 for HEP  []  No new Education completed  [x]  Reviewed Prior HEP      []  Patient verbalized and/or demonstrated understanding of education provided. []  Patient unable to verbalize and/or demonstrate understanding of education provided. Will continue education. [x]  Barriers to learning: none    PLAN:  Treatment Recommendations: Strengthening, Range of Motion, Balance Training, Gait Training, Stair Training, Home Exercise Program and Patient Education    []  Plan of care initiated. Plan to see patient 2 times per week for 12 weeks to address the treatment planned outlined above.   [x]  Continue with current plan of care  []  Modify plan of care as follows:    []  Hold pending physician visit  []  Discharge    Time In 930   Time Out 1000   Timed Code Minutes: 30 min   Total Treatment Time: 30 min       Electronically Signed by: Jh Pierre PT

## 2021-08-25 ENCOUNTER — HOSPITAL ENCOUNTER (OUTPATIENT)
Dept: PHYSICAL THERAPY | Age: 31
Setting detail: THERAPIES SERIES
Discharge: HOME | End: 2021-08-25
Payer: COMMERCIAL

## 2021-08-25 PROCEDURE — 97110 THERAPEUTIC EXERCISES: CPT

## 2021-08-25 NOTE — PROGRESS NOTES
[address] [facility]  PHYSICAL THERAPY  [] EVALUATION  [x] DAILY NOTE (LAND) [] DAILY NOTE (AQUATIC ) [] PROGRESS NOTE [] DISCHARGE NOTE    [] 5 [facility]   [x] [place] [address]    [] [facility]   [] Domingo Amabilantoinette    Date: 2021  Patient Name:  Mary Lou Franz  : 1990  MRN: [id number]  CSN: [id number]    Referring Practitioner Zohra Talley MD   Diagnosis Sprain of anterior cruciate ligament of right knee, initial encounter [O44.314W]  Other tear of medial meniscus, current injury, right knee, initial encounter [S83.241A]  Tear of articular cartilage of right knee, current, initial encounter Darshana Xiomy    Treatment Diagnosis S83.241A, S83.511A, S83.31XA   Date of Evaluation 21   Additional Pertinent History R ACL reconstruction  Dr. Sahil Cruz. 2nd ACL reconstruction and meniscus repair on 21 by Dr. Arline Elder. Functional Outcome Measure Used LEFS    Functional Outcome Score eval score 3 (21)       Insurance: Primary: Payor: Shania Blanc /  /  / ,   Secondary:    Authorization Information: 12 visits, pays at 100%, - 10/01   Visit # 5, 5/10 for progress note   Visits Allowed: 12   Recertification Date:    Physician Follow-Up:    Physician Orders:    History of Present Illness:      SUBJECTIVE:  Pt reports increased pain today, 3-4/10. Pt reports she fell Monday after PT, when trying to get into her house. Pt reports doctor wants her to have brace locked at 0 degree when sleeping to increase knee ext. Stitches removed Monday and happy with incision.     OBJECTIVE:  TREATMENT   Precautions: NWB R LE , no flexion more than 90 degrees until 21   Pain: 3/10 R knee    X in shaded column indicates activity completed today   Modalities Parameters/  Location  Notes                     Manual Therapy Time/Technique  Notes                     Exercise/Intervention   Notes   Heel slide with strap 15  x Quad set 10 5 sec x AROM R knee 0- 76 degrees   DF belt stretch 5 15 sec x    Sitting EOB knee flexion 3 min  x    SLR 3 way 15 5 x Hip flex, abd          Bike seat 2, min assist on/off step 4 minutes  x                                  Specific Interventions Next Treatment: NWB R ACL protocol , no flexion > 90 degrees  X 4 weeks until 9/5/21. Activity/Treatment Tolerance:  [x]  Patient tolerated treatment well  []  Patient limited by fatigue  []  Patient limited by pain   []  Patient limited by medical complications  []  Other:     Assessment: Pt fearful of falling again, discussed safety with steps/AD. No change in AROM this date. GOALS:  Patient Goal: to get my knee back to normal    Short Term Goals:  Time Frame: 4 weeks  1. Increase AROM R hip flexion to 40, knee 0- 90 degrees to allow patient to report able to get left knee in/out of car without difficulty  2. Increase strength R LE to 3+/5 to allow patient to get right leg in/out of car and bed without using arms  3. I with HEP as prescribed to allow patient to report able to sleep x 4 hours without awakening due to R knee pain    Long Term Goals:  Time Frame: 12 weeks  1. Increase AROM R hip flexion to 60, knee 0- 125 degrees to allow patient to report able to walk with normal gait pattern with no pain  2. Increase strength R LE to 4/5 to allow patient to go up and down steps reciprocally with no pain  3. I with HEP as prescribed to allow patient to walk x 30 minutes at grocery with no R knee pain    Patient Education:   [facility] []  HEP/Education Completed: increased reps to 15 for HEP  []  No new Education completed  [x]  Reviewed Prior HEP      []  Patient verbalized and/or demonstrated understanding of education provided. []  Patient unable to verbalize and/or demonstrate understanding of education provided. Will continue education.   [x]  Barriers to learning: none    PLAN:  Treatment Recommendations: Strengthening, Range of Motion, Balance Training, Gait Training, Stair Training, Home Exercise Program and Patient Education    []  Plan of care initiated. Plan to see patient 2 times per week for 12 weeks to address the treatment planned outlined above.   [x]  Continue with current plan of care  []  Modify plan of care as follows:    []  Hold pending physician visit  []  Discharge    Time In 905   Time Out 0940   Timed Code Minutes: 35 min   Total Treatment Time: 35 min       Electronically Signed by: Gamal Olivares, PTA [id number]

## 2021-08-27 ENCOUNTER — HOSPITAL ENCOUNTER (OUTPATIENT)
Dept: PHYSICAL THERAPY | Age: 31
Setting detail: THERAPIES SERIES
Discharge: HOME | End: 2021-08-27
Payer: COMMERCIAL

## 2021-08-27 PROCEDURE — 97110 THERAPEUTIC EXERCISES: CPT

## 2021-08-27 NOTE — PROGRESS NOTES
[address] [facility]  PHYSICAL THERAPY  [] EVALUATION  [x] DAILY NOTE (LAND) [] DAILY NOTE (AQUATIC ) [] PROGRESS NOTE [] DISCHARGE NOTE    [] [address] [facility]   [x] Joy [address]    [] [facility]   [] Daysi Schmid    Date: 2021  Patient Name:  Oma Rincon  : 1990  MRN: [id number]  CSN: [id number]    Referring Practitioner Stella Hoover MD   Diagnosis Sprain of anterior cruciate ligament of right knee, initial encounter [S83.511A]  Other tear of medial meniscus, current injury, right knee, initial encounter [S83.241A]  Tear of articular cartilage of right knee, current, initial encounter Robert Moctezuma    Treatment Diagnosis S83.241A, S83.511A, S83.31XA   Date of Evaluation 21   Additional Pertinent History R ACL reconstruction  Dr. Анна Powers. 2nd ACL reconstruction and meniscus repair on 21 by Dr. Colleen Valentin. Functional Outcome Measure Used LEFS    Functional Outcome Score eval score 3 (21)       Insurance: Primary: Payor: Regina Arteaga /  /  / ,   Secondary:    Authorization Information: 12 visits, pays at 100%, - 10/01   Visit # 6, 6/10 for progress note   Visits Allowed: 12   Recertification Date:    Physician Follow-Up:    Physician Orders:    History of Present Illness:      SUBJECTIVE:  Patient brought in op reports , reports still NWB until .   Reports told that she may need to have another surgery for knee with meniscus issue, per op reports did NOT have meniscus repair  OBJECTIVE:  TREATMENT   Precautions: NWB R LE  , no restriction on AROM, keep brace locked in extension at all times except with PT and HEP   Pain: 3/10 R knee    X in shaded column indicates activity completed today   Modalities Parameters/  Location  Notes                     Manual Therapy Time/Technique  Notes                     Exercise/Intervention   Notes   Heel slide with strap 20  x    Quad set 2 x 10 5 sec x AROM R knee 0- 85 degrees   DF belt stretch 5 15 sec x    Sitting EOB knee flexion 10 5 x    SLR 3 way 15 5 x Hip flex, abd          Bike seat 2, min assist on/off step 5 minutes  x                                  Specific Interventions Next Treatment: NWB R ACL protocol without meniscus repair , No restrictions with AROM, brace locked in 0 degrees at all times except with PT and HEP    Activity/Treatment Tolerance:  [x]  Patient tolerated treatment well  []  Patient limited by fatigue  []  Patient limited by pain   []  Patient limited by medical complications  []  Other:     Assessment: PT discussed needing to stay positive and work on [employer] , focus on rehab. Patient stating several times \"I just think of all the things I can't do if I have to have another surgery\" , PT did question if anti-depressant that MD recommend     GOALS:  Patient Goal: to get my knee back to normal    Short Term Goals:  Time Frame: 4 weeks  1. Increase AROM R hip flexion to 40, knee 0- 90 degrees to allow patient to report able to get left knee in/out of car without difficulty  2. Increase strength R LE to 3+/5 to allow patient to get right leg in/out of car and bed without using arms  3. I with HEP as prescribed to allow patient to report able to sleep x 4 hours without awakening due to R knee pain    Long Term Goals:  Time Frame: 12 weeks  1. Increase AROM R hip flexion to 60, knee 0- 125 degrees to allow patient to report able to walk with normal gait pattern with no pain  2. Increase strength R LE to 4/5 to allow patient to go up and down steps reciprocally with no pain  3. I with HEP as prescribed to allow patient to walk x 30 minutes at grocery with no R knee pain    Patient Education:    [x]  HEP/Education Completed: increased reps to 15 for HEP  []  No new Education completed  [x]  Reviewed Prior HEP      []  Patient verbalized and/or demonstrated understanding of education provided.   []  Patient unable to verbalize and/or demonstrate understanding of education provided. Will continue education. [x]  Barriers to learning: none    PLAN:  Treatment Recommendations: Strengthening, Range of Motion, Balance Training, Gait Training, Stair Training, Home Exercise Program and Patient Education    []  Plan of care initiated. Plan to see patient 2 times per week for 12 weeks to address the treatment planned outlined above.   [x]  Continue with current plan of care  []  Modify plan of care as follows:    []  Hold pending physician visit  []  Discharge    Time In 900   Time Out 0930   Timed Code Minutes: 30 min   Total Treatment Time: 30 min       Electronically Signed by: Bhakti Bacon PT

## 2021-08-30 ENCOUNTER — HOSPITAL ENCOUNTER (OUTPATIENT)
Dept: PHYSICAL THERAPY | Age: 31
Setting detail: THERAPIES SERIES
Discharge: HOME | End: 2021-08-30
Payer: COMMERCIAL

## 2021-08-30 PROCEDURE — 97110 THERAPEUTIC EXERCISES: CPT

## 2021-08-30 NOTE — PROGRESS NOTES
[address] [facility]  PHYSICAL THERAPY  [] EVALUATION  [x] DAILY NOTE (LAND) [] DAILY NOTE (AQUATIC ) [] PROGRESS NOTE [] DISCHARGE NOTE    [] 615 [facility]   [x] [place]     [] [facility]   [] [facility]    Date: 2021  Patient Name:  Marcio Cardona  : 1990  MRN: [id number]  CSN: [id number]    Referring Practitioner Bessie Perry MD   Diagnosis Sprain of anterior cruciate ligament of right knee, initial encounter [S83.511A]  Other tear of medial meniscus, current injury, right knee, initial encounter [S83.241A]  Tear of articular cartilage of right knee, current, initial encounter Yolanda Barron    Treatment Diagnosis S83.241A, S83.511A, S83.31XA   Date of Evaluation 21   Additional Pertinent History R ACL reconstruction  Dr. Elena Steele. 2nd ACL reconstruction and meniscus repair on 21 by Dr. Hasmukh Moyer. Functional Outcome Measure Used LEFS    Functional Outcome Score eval score 3 (21)       Insurance: Primary: Payor: Rebeca Rosas /  /  / ,   Secondary:    Authorization Information: 12 visits, pays at 100%, - 10/01   Visit # 7, 7/10 for progress note   Visits Allowed: 12   Recertification Date:    Physician Follow-Up:    Physician Orders:    History of Present Illness:      SUBJECTIVE:  Patient reports she has decreased her pain medication use to half pill during the day.   Pain today 2-3/10, mostly stiffness    OBJECTIVE:  TREATMENT   Precautions: NWB R LE  , no restriction on AROM, keep brace locked in extension at all times except with PT and HEP   Pain: 2-3/10 R knee    X in shaded column indicates activity completed today   Modalities Parameters/  Location  Notes                     Manual Therapy Time/Technique  Notes                     Exercise/Intervention   Notes   Heel slide with strap 20  x    Quad set 2 x 10 5 sec x AROM R knee 0- 86 degrees   DF belt stretch 5 15 sec x Sitting EOB knee flexion 15 5 x    SLR 3 way 15 5 x Hip flex, abd          Bike seat 2, min assist on/off step 5 minutes                                    Specific Interventions Next Treatment: NWB R ACL protocol without meniscus repair , No restrictions with AROM, brace locked in 0 degrees at all times except with PT and HEP    Activity/Treatment Tolerance:  [x]  Patient tolerated treatment well  []  Patient limited by fatigue  []  Patient limited by pain   []  Patient limited by medical complications  []  Other:     Assessment: pt progressing as tolerable, AROM remaining the same today, no increased pain/soreness after session. GOALS:  Patient Goal: to get my knee back to normal    Short Term Goals:  Time Frame: 4 weeks  1. Increase AROM R hip flexion to 40, knee 0- 90 degrees to allow patient to report able to get left knee in/out of car without difficulty  2. Increase strength R LE to 3+/5 to allow patient to get right leg in/out of car and bed without using arms  3. I with HEP as prescribed to allow patient to report able to sleep x 4 hours without awakening due to R knee pain    Long Term Goals:  Time Frame: 12 weeks  1. Increase AROM R hip flexion to 60, knee 0- 125 degrees to allow patient to report able to walk with normal gait pattern with no pain  2. Increase strength R LE to 4/5 to allow patient to go up and down steps reciprocally with no pain  3. I with HEP as prescribed to allow patient to walk x 30 minutes at grocery with no R knee pain    Patient Education:    [x]  HEP/Education Completed: increased reps to 15 for HEP  []  No new Education completed  [x]  Reviewed Prior HEP      []  Patient verbalized and/or demonstrated understanding of education provided. []  Patient unable to verbalize and/or demonstrate understanding of education provided. Will continue education.   [x]  Barriers to learning: none    PLAN:  Treatment Recommendations: Strengthening, Range of Motion, Balance Training, Gait Training, Stair Training, Home Exercise Program and Patient Education    []  Plan of care initiated. Plan to see patient 2 times per week for 12 weeks to address the treatment planned outlined above.   [x]  Continue with current plan of care  []  Modify plan of care as follows:    []  Hold pending physician visit  []  Discharge    Time In 900   Time Out 0934   Timed Code Minutes: 34 min   Total Treatment Time: 34 min       Electronically Signed by: Margaux Suresh, PTA [id number]

## 2021-09-01 ENCOUNTER — HOSPITAL ENCOUNTER (OUTPATIENT)
Dept: PHYSICAL THERAPY | Age: 31
Setting detail: THERAPIES SERIES
Discharge: HOME | End: 2021-09-01
Payer: COMMERCIAL

## 2021-09-01 PROCEDURE — 97110 THERAPEUTIC EXERCISES: CPT

## 2021-09-01 NOTE — PROGRESS NOTES
[address] [facility]  PHYSICAL THERAPY  [] EVALUATION  [x] DAILY NOTE (LAND) [] DAILY NOTE (AQUATIC ) [] PROGRESS NOTE [] DISCHARGE NOTE    [] [facility]   [x] Joy [address]    [] [facility]   [] Hedy Sullivan    Date: 2021  Patient Name:  Kandy Chou  : 1990  MRN: [id number]  CSN: [id number]    Referring Practitioner Annette Paredes MD   Diagnosis Sprain of anterior cruciate ligament of right knee, initial encounter [B93.794N]  Other tear of medial meniscus, current injury, right knee, initial encounter [S83.241A]  Tear of articular cartilage of right knee, current, initial encounter Alejo Ramos    Treatment Diagnosis S83.241A, S83.511A, S83.31XA   Date of Evaluation 21   Additional Pertinent History R ACL reconstruction  Dr. Jr Tavares. 2nd ACL reconstruction and meniscus repair on 21 by Dr. Arnoldo Rose. Functional Outcome Measure Used LEFS    Functional Outcome Score eval score 3 (21)       Insurance: Primary: Payor: Ania Dos Santos /  /  / ,   Secondary:    Authorization Information: 12 visits, pays at 100%, - 10/01   Visit # 8, 8/10 for progress note   Visits Allowed: 12   Recertification Date:    Physician Follow-Up:    Physician Orders:    History of Present Illness:      SUBJECTIVE:  Patient reports pain 1-2/10, did not take pain medication before session.     OBJECTIVE:  TREATMENT   Precautions: NWB R LE  , no restriction on AROM, keep brace locked in extension at all times except with PT and HEP   Pain: 1-2/10 R knee    X in shaded column indicates activity completed today   Modalities Parameters/  Location  Notes                     Manual Therapy Time/Technique  Notes                     Exercise/Intervention   Notes   Heel slide with strap 20  x    Quad set 2 x 10 5 sec x AROM R knee 0- 97 degrees, AAROM 100 degrees   DF belt stretch 5 15 sec x HEP   Sitting EOB knee flexion 15 5 x SLR 4 way 15 5 x Added hip add SL          Bike seat 2, min assist on/off step 5 minutes  x                                  Specific Interventions Next Treatment: NWB R ACL protocol without meniscus repair , No restrictions with AROM, brace locked in 0 degrees at all times except with PT and HEP    Activity/Treatment Tolerance:  [x]  Patient tolerated treatment well  []  Patient limited by fatigue  []  Patient limited by pain   []  Patient limited by medical complications  []  Other:     Assessment: pt progressing well, increased AROM knee flex this date, added hip add SL. GOALS:  Patient Goal: to get my knee back to normal    Short Term Goals:  Time Frame: 4 weeks  1. Increase AROM R hip flexion to 40, knee 0- 90 degrees to allow patient to report able to get left knee in/out of car without difficulty  2. Increase strength R LE to 3+/5 to allow patient to get right leg in/out of car and bed without using arms  3. I with HEP as prescribed to allow patient to report able to sleep x 4 hours without awakening due to R knee pain    Long Term Goals:  Time Frame: 12 weeks  1. Increase AROM R hip flexion to 60, knee 0- 125 degrees to allow patient to report able to walk with normal gait pattern with no pain  2. Increase strength R LE to 4/5 to allow patient to go up and down steps reciprocally with no pain  3. I with HEP as prescribed to allow patient to walk x 30 minutes at grocery with no R knee pain    Patient Education:    [x]  HEP/Education Completed: increased reps to 15 for HEP  []  No new Education completed  [x]  Reviewed Prior HEP      []  Patient verbalized and/or demonstrated understanding of education provided. []  Patient unable to verbalize and/or demonstrate understanding of education provided. Will continue education.   [x]  Barriers to learning: none    PLAN:  Treatment Recommendations: Strengthening, Range of Motion, Balance Training, Gait Training, Stair Training, Home Exercise Program and Patient Education    []  Plan of care initiated. Plan to see patient 2 times per week for 12 weeks to address the treatment planned outlined above.   [x]  Continue with current plan of care  []  Modify plan of care as follows:    []  Hold pending physician visit  []  Discharge    Time In 0900   Time Out 0933   Timed Code Minutes: 33 min   Total Treatment Time: 33 min       Electronically Signed by: Irina Barlow, PTA [id number]

## 2021-09-08 ENCOUNTER — HOSPITAL ENCOUNTER (OUTPATIENT)
Dept: PHYSICAL THERAPY | Age: 31
Setting detail: THERAPIES SERIES
Discharge: HOME | End: 2021-09-08
Payer: COMMERCIAL

## 2021-09-08 PROCEDURE — 97110 THERAPEUTIC EXERCISES: CPT

## 2021-09-08 NOTE — PROGRESS NOTES
[facility]  PHYSICAL THERAPY  [x] DAILY NOTE (LAND) [] DAILY NOTE (AQUATIC ) [] PROGRESS NOTE [] DISCHARGE NOTE    [] [facility]   [x] [place]     [] [facility]   [] Domingo Amabile    Date: 2021  Patient Name:  Mary Lou Franz  : 1990  MRN: [id number]  CSN: [id number]    Referring Practitioner Zohra Talley MD   Diagnosis Sprain of anterior cruciate ligament of right knee, initial encounter [S83.511A]  Other tear of medial meniscus, current injury, right knee, initial encounter [S83.241A]  Tear of articular cartilage of right knee, current, initial encounter Darshana Stauffer    Treatment Diagnosis S83.241A, S83.511A, S83.31XA   Date of Evaluation 21   Additional Pertinent History R ACL reconstruction  Dr. Sahil Cruz. 2nd ACL reconstruction and meniscus repair on 21 by Dr. Arline Elder. Functional Outcome Measure Used LEFS    Functional Outcome Score eval score 3 (21)       Insurance: Primary: Payor: Shania Blanc /  /  / ,   Secondary:    Authorization Information: 12 visits, pays at 100%, - 10/01   Visit # 9, 9/10 for progress note   Visits Allowed: 12   Recertification Date:    Physician Follow-Up:    Physician Orders:    History of Present Illness:      SUBJECTIVE:  Patient late to session today and reports pain 3-4/10 and is out of pain pills and is not sleeping well due to pain.     OBJECTIVE:  TREATMENT   Precautions: NWB R LE  , no restriction on AROM, keep brace locked in extension at all times except with PT and HEP   Pain: 1-2/10 R knee    X in shaded column indicates activity completed today   Modalities Parameters/  Location  Notes                     Manual Therapy Time/Technique  Notes                     Exercise/Intervention   Notes   Heel slide with strap 20  x    Quad set 1 x 15 5 sec x AROM R knee 0- 110 degrees   DF belt stretch 5 15 sec  HEP   Sitting EOB knee flexion 15 5     SLR 4 way 15 5 x Added hip add SL          Bike seat 2, min assist on/off step 5 minutes  x                                  Specific Interventions Next Treatment: NWB R ACL protocol without meniscus repair , No restrictions with AROM, brace locked in 0 degrees at all times except with PT and HEP    Activity/Treatment Tolerance:  [x]  Patient tolerated treatment well  []  Patient limited by fatigue  []  Patient limited by pain   []  Patient limited by medical complications  []  Other:     Assessment: Session slightly limited due to patient being late to session but did show improvement in knee flexion. GOALS:  Patient Goal: to get my knee back to normal    Short Term Goals:  Time Frame: 4 weeks  1. Increase AROM R hip flexion to 40, knee 0- 90 degrees to allow patient to report able to get left knee in/out of car without difficulty  2. Increase strength R LE to 3+/5 to allow patient to get right leg in/out of car and bed without using arms  3. I with HEP as prescribed to allow patient to report able to sleep x 4 hours without awakening due to R knee pain    Long Term Goals:  Time Frame: 12 weeks  1. Increase AROM R hip flexion to 60, knee 0- 125 degrees to allow patient to report able to walk with normal gait pattern with no pain  2. Increase strength R LE to 4/5 to allow patient to go up and down steps reciprocally with no pain  3. I with HEP as prescribed to allow patient to walk x 30 minutes at grocery with no R knee pain    Patient Education:    [x]  HEP/Education Completed: increased reps to 15 for HEP  []  No new Education completed  [x]  Reviewed Prior HEP      []  Patient verbalized and/or demonstrated understanding of education provided. []  Patient unable to verbalize and/or demonstrate understanding of education provided. Will continue education.   [x]  Barriers to learning: none    PLAN:  Treatment Recommendations: Strengthening, Range of Motion, Balance Training, Gait Training, Stair Training, Home Exercise Program and Patient Education    []  Plan of care initiated. Plan to see patient 2 times per week for 12 weeks to address the treatment planned outlined above.   [x]  Continue with current plan of care  []  Modify plan of care as follows:    []  Hold pending physician visit  []  Discharge    Time In 0943   Time Out 1002   Timed Code Minutes: 19 min   Total Treatment Time: 19 min       Electronically Signed by: Regla Monk PTA [id number]

## 2021-09-10 ENCOUNTER — HOSPITAL ENCOUNTER (OUTPATIENT)
Dept: PHYSICAL THERAPY | Age: 31
Setting detail: THERAPIES SERIES
Discharge: HOME | End: 2021-09-10
Payer: COMMERCIAL

## 2021-09-10 PROCEDURE — 97110 THERAPEUTIC EXERCISES: CPT

## 2021-09-10 NOTE — PROGRESS NOTES
** PLEASE SIGN, DATE AND TIME CERTIFICATION BELOW AND RETURN TO [facility] OUTPATIENT REHABILITATION (FAX #: [phone]). ATTEST/CO-SIGN IF ACCESSING VIA [employer] [employer]. THANK YOU.**    I certify that I have examined the patient below and determined that Physical Medicine and Rehabilitation service is necessary and that I approve the established plan of care for up to 90 days or as specifically noted. Attestation, signature or co-signature of physician indicates approval of certification requirements.    ________________________ ____________ __________  Physician Signature   Date   Time     [address] [facility]  PHYSICAL THERAPY  [] DAILY NOTE (LAND) [] DAILY NOTE (AQUATIC ) [x] PROGRESS NOTE [] DISCHARGE NOTE    [] [address] [facility]   [x] [address]    [] [facility]   [] [facility]    Date: 9/10/2021  Patient Name:  Nedra Cisse  : 1990  MRN: [id number]  CSN: [id number]    Referring Practitioner Teto Gandhi MD   Diagnosis Sprain of anterior cruciate ligament of right knee, initial encounter [M95.092J]  Other tear of medial meniscus, current injury, right knee, initial encounter [S83.241A]  Tear of articular cartilage of right knee, current, initial encounter Phijoseph Abdon    Treatment Diagnosis S83.241A, S83.511A, S83.31XA   Date of Evaluation 21   Additional Pertinent History R ACL reconstruction 2006 Dr. Shayla Josue. 2nd ACL reconstruction and meniscus repair on 21 by Dr. Krish Almanzar.       Functional Outcome Measure Used LEFS    Functional Outcome Score eval score 3 (21)       Insurance: Primary: Payor: Steven Asher /  /  / ,   Secondary:    Authorization Information: 12 visits, pays at 100%, - 10/01   Visit # 10, 10/10 for progress note   Visits Allowed: 12   Recertification Date: 40/15/67   Physician Follow-Up:    Physician Orders:    History of Present Illness:      SUBJECTIVE:  Patient reports taking last pain med on Wednesday, difficulty sleeping since then. Patient reports pain today 1/10. Is compliant with HEP 2 x per day. High pain levels 4/10    OBJECTIVE:  TREATMENT   Precautions: NWB R LE 9/20 , no restriction on AROM, keep brace locked in extension at all times except with PT and HEP   Pain: 1/10 R knee    X in shaded column indicates activity completed today   Modalities Parameters/  Location  Notes                     Manual Therapy Time/Technique  Notes                     Exercise/Intervention   Notes   Heel slide with strap 20  x    Quad set 1 x 15 5 sec x AROM R knee 0- 113 degrees   DF belt stretch 5 15 sec  HEP   Sitting EOB knee flexion 15 5     SLR 4 way 15 5 x Added hip add SL          Bike seat 2, min assist on/off step 5 minutes  x                                  Specific Interventions Next Treatment: NWB R ACL protocol without meniscus repair , No restrictions with AROM, brace locked in 0 degrees at all times except with PT and HEP    Activity/Treatment Tolerance:  [x]  Patient tolerated treatment well  []  Patient limited by fatigue  []  Patient limited by pain   []  Patient limited by medical complications  []  Other:     Assessment: patient is progressing well. Did advise patient to contact [address] [place] [address] to see if new c9 has been submitted as currently only has 2 more visits approved. PT will progress to WB/standing/protocol after released by MD for WB. Increased time with PN and increased reps. PT recommends 8 more weeks PT as patient has not yet been able to start WB and strengthening, gait training or balance exercises. GOALS:  Patient Goal: to get my knee back to normal    Short Term Goals:  Time Frame: 4 weeks  1. Increase AROM R hip flexion to 40, knee 0- 90 degrees to allow patient to report able to get left knee in/out of car without difficulty. MET R HIP FLEXION 60, KNEE 0- 113 DEGREES  2.  Increase strength R LE to 3+/5 to allow patient to get right leg in/out of car and bed without using arms. MET STRENGTH 3+/5  3. I with HEP as prescribed to allow patient to report able to sleep x 4 hours without awakening due to R knee pain. MET SLEEPING 4 HOURS THEN AWAKENING    Long Term Goals:  Time Frame: 12 weeks  1. Increase AROM R hip flexion to 60, knee 0- 125 degrees to allow patient to report able to walk with normal gait pattern with no pain. NOT MET- ONGOING, CONTINUE  2. Increase strength R LE to 4/5 to allow patient to go up and down steps reciprocally with no pain. NOT MET- ONGOING, CONTINUE  3. I with HEP as prescribed to allow patient to walk x 30 minutes at grocery with no R knee pain. NOT MET- ONGOING, CONTINUE    REVISED GOALS  Short Term Goals:  Time Frame: 4 weeks  1. Increase AROM R hip flexion to 70, knee 0- 120 degrees to allow patient to report able to walk in house without crutches as OK per MD  2. Increase strength R LE to 4-/5 to allow patient to return to driving when OK per MD  3. I with HEP as prescribed to allow patient to report able to sleep x 6 hours without awakening due to R knee pain. Long Term Goals:  Time Frame: 8 weeks  1. Increase AROM R hip flexion to 60, knee 0- 125 degrees to allow patient to report able to walk with normal gait pattern with no pain. 2. Increase strength R LE to 4/5 to allow patient to go up and down steps reciprocally with no pain. 3. I with HEP as prescribed to allow patient to walk x 30 minutes at grocery with no R knee pain. Patient Education:    [x]  HEP/Education Completed: increased reps to 25 for HEP  []  No new Education completed  [x]  Reviewed Prior HEP      []  Patient verbalized and/or demonstrated understanding of education provided. []  Patient unable to verbalize and/or demonstrate understanding of education provided. Will continue education.   [x]  Barriers to learning: none    PLAN:  Treatment Recommendations: Strengthening, Range of Motion, Balance Training, Gait Training, Stair Training, Home Exercise Program and Patient Education   Plan to see patient 2 times per week for 8weeks to address the treatment planned outlined above.   [x]  Continue with current plan of care  []  Modify plan of care as follows:    []  Hold pending physician visit  []  Discharge    Time In 1030   Time Out 1110   Timed Code Minutes: 40 min   Total Treatment Time: 40 min       Electronically Signed by: Arnoldo Noland, PT

## 2021-09-13 ENCOUNTER — HOSPITAL ENCOUNTER (OUTPATIENT)
Dept: PHYSICAL THERAPY | Age: 31
Setting detail: THERAPIES SERIES
Discharge: HOME | End: 2021-09-13
Payer: COMMERCIAL

## 2021-09-13 PROCEDURE — 97110 THERAPEUTIC EXERCISES: CPT

## 2021-09-13 NOTE — PROGRESS NOTES
[address] [facility]  PHYSICAL THERAPY  [x] DAILY NOTE (LAND) [] DAILY NOTE (AQUATIC ) [] PROGRESS NOTE [] DISCHARGE NOTE    [] [facility]   [x] [address]    [] [facility]   [] Shakila Lights    Date: 2021  Patient Name:  Elyse Arteaga  : 1990  MRN: [id number]  CSN: [id number]    Referring Practitioner Louise Rust MD   Diagnosis Sprain of anterior cruciate ligament of right knee, initial encounter [S83.511A]  Other tear of medial meniscus, current injury, right knee, initial encounter [S83.241A]  Tear of articular cartilage of right knee, current, initial encounter Oscarelio Isatu    Treatment Diagnosis S83.241A, S83.511A, S83.31XA   Date of Evaluation 21   Additional Pertinent History R ACL reconstruction  Dr. Jessica Claudio. 2nd ACL reconstruction and meniscus repair on 21 by Dr. Julisa Miller. Functional Outcome Measure Used LEFS    Functional Outcome Score eval score 3 (21)       Insurance: Primary: Payor: [employer] /  /  / ,   Secondary:    Authorization Information: 12 visits, pays at 100%, - 10/01   Visit # 6,  for progress note   Visits Allowed: 12   Recertification Date:    Physician Follow-Up:    Physician Orders:    History of Present Illness:      SUBJECTIVE:  Patient reports only taking tylenol for pain. Has difficulty at night falling asleep. Has been doing 20 reps at home for HEP, x2 daily.      OBJECTIVE:  TREATMENT   Precautions: NWB R LE  , no restriction on AROM, keep brace locked in extension at all times except with PT and HEP   Pain: 2/10 R knee    X in shaded column indicates activity completed today   Modalities Parameters/  Location  Notes                     Manual Therapy Time/Technique  Notes                     Exercise/Intervention   Notes   Heel slide with strap 20  x    Quad set 1 x 15 5 sec x AROM R knee 0- 110 degrees   DF belt stretch 5 15 sec  HEP Sitting EOB knee flexion 15 5     SLR 4 way 20 5 x           Bike seat 2, min assist on/off step 5 minutes  x                                  Specific Interventions Next Treatment: NWB R ACL protocol without meniscus repair , No restrictions with AROM, brace locked in 0 degrees at all times except with PT and HEP    Activity/Treatment Tolerance:  [x]  Patient tolerated treatment well  []  Patient limited by fatigue  []  Patient limited by pain   []  Patient limited by medical complications  []  Other:     Assessment: patient is progressing well, pt reports she is going to call [facility] today to see if they received additional visits. Increased tightness today, loosing 3 degrees of knee flexion. Discussed continued use of ice and scar tissue massaging. GOALS:  Patient Goal: to get my knee back to normal    Short Term Goals:  Time Frame: 4 weeks  1. Increase AROM R hip flexion to 40, knee 0- 90 degrees to allow patient to report able to get left knee in/out of car without difficulty. MET R HIP FLEXION 60, KNEE 0- 113 DEGREES  2. Increase strength R LE to 3+/5 to allow patient to get right leg in/out of car and bed without using arms. MET STRENGTH 3+/5  3. I with HEP as prescribed to allow patient to report able to sleep x 4 hours without awakening due to R knee pain. MET SLEEPING 4 HOURS THEN AWAKENING    Long Term Goals:  Time Frame: 12 weeks  1. Increase AROM R hip flexion to 60, knee 0- 125 degrees to allow patient to report able to walk with normal gait pattern with no pain. NOT MET- ONGOING, CONTINUE  2. Increase strength R LE to 4/5 to allow patient to go up and down steps reciprocally with no pain. NOT MET- ONGOING, CONTINUE  3. I with HEP as prescribed to allow patient to walk x 30 minutes at grocery with no R knee pain. NOT MET- ONGOING, CONTINUE    REVISED GOALS  Short Term Goals:  Time Frame: 4 weeks  1.  Increase AROM R hip flexion to 70, knee 0- 120 degrees to allow patient to report able to

## 2021-09-15 ENCOUNTER — HOSPITAL ENCOUNTER (OUTPATIENT)
Dept: PHYSICAL THERAPY | Age: 31
Setting detail: THERAPIES SERIES
End: 2021-09-15
Payer: COMMERCIAL

## 2021-09-22 ENCOUNTER — HOSPITAL ENCOUNTER (OUTPATIENT)
Dept: PHYSICAL THERAPY | Age: 31
Setting detail: THERAPIES SERIES
Discharge: HOME | End: 2021-09-22
Payer: COMMERCIAL

## 2021-09-22 PROCEDURE — 97110 THERAPEUTIC EXERCISES: CPT

## 2021-09-22 NOTE — PROGRESS NOTES
[address] [facility]  PHYSICAL THERAPY  [x] DAILY NOTE (LAND) [] DAILY NOTE (AQUATIC ) [] PROGRESS NOTE [] DISCHARGE NOTE    [] [facility]   [x] [address]    [] [facility][1]   [] Wilder Rodriguez    Date: 2021  Patient Name:  Judy Montano  : 1990  MRN: [id number]  CSN: [id number]    Referring Practitioner Armando Aguilar MD   Diagnosis No admission diagnoses are documented for this encounter. Treatment Diagnosis S83.241A, [id number], S83.31XA   Date of Evaluation 21   Additional Pertinent History R ACL reconstruction  Dr. Janusz Mulligan. 2nd ACL reconstruction and meniscus repair on 21 by Dr. Lauren George. Functional Outcome Measure Used LEFS    Functional Outcome Score eval score 3 (21)       Insurance: Primary: Payor: Chris Low /  /  / ,   Secondary:    Authorization Information: 12 visits, pays at 100%, - 10/01   Visit # 11, 11 for progress note   Visits Allowed: 12   Recertification Date:    Physician Follow-Up: 2021   Physician Orders:    History of Present Illness:      SUBJECTIVE:  Patient reports decreased pain today 0-1/10. Pt had doc appt . New WB protocol. Pt scheduled for 8 more visits. OBJECTIVE:  TREATMENT   Precautions: PWB 50% with crutches 4 weeks from , FWB 2 weeks.    Pain: 0-1/10 R knee    X in shaded column indicates activity completed today   Modalities Parameters/  Location  Notes                     Manual Therapy Time/Technique  Notes                     Exercise/Intervention   Notes   Heel slide with strap 20  x    Quad set 1 x 15 5 sec x AROM R knee 0- 115 degrees   DF belt stretch 5 15 sec x    Sitting EOB knee flexion 15 5     SLR 4 way 20 5 x           Bike seat 2 5 minutes  x Full revolutions fwd   Total gym Heel raises 50% PWB 2x10  x Level 4                 Gait training with new PWB status   x Crutches, 50%, heel strike, knee flexion with swing through     Specific Interventions Next Treatment: NWB R ACL protocol without meniscus repair , No restrictions with AROM, brace locked in 0 degrees at all times except with PT and HEP    Activity/Treatment Tolerance:  [x]  Patient tolerated treatment well  []  Patient limited by fatigue  []  Patient limited by pain   []  Patient limited by medical complications  []  Other:     Assessment: Progressed exs with total gym, 50% PWB. Dicussed proper gait with new WB status. Pt unsure of what 50% WB feels like. Discussed using scale at home to visually see and feel what PWB should be. GOALS:  Patient Goal: to get my knee back to normal    Short Term Goals:  Time Frame: 4 weeks  1. Increase AROM R hip flexion to 40, knee 0- 90 degrees to allow patient to report able to get left knee in/out of car without difficulty. MET R HIP FLEXION 60, KNEE 0- 113 DEGREES  2. Increase strength R LE to 3+/5 to allow patient to get right leg in/out of car and bed without using arms. MET STRENGTH 3+/5  3. I with HEP as prescribed to allow patient to report able to sleep x 4 hours without awakening due to R knee pain. MET SLEEPING 4 HOURS THEN AWAKENING    Long Term Goals:  Time Frame: 12 weeks  1. Increase AROM R hip flexion to 60, knee 0- 125 degrees to allow patient to report able to walk with normal gait pattern with no pain. NOT MET- ONGOING, CONTINUE  2. Increase strength R LE to 4/5 to allow patient to go up and down steps reciprocally with no pain. NOT MET- ONGOING, CONTINUE  3. I with HEP as prescribed to allow patient to walk x 30 minutes at grocery with no R knee pain. NOT MET- ONGOING, CONTINUE    REVISED GOALS  Short Term Goals:  Time Frame: 4 weeks  1. Increase AROM R hip flexion to 70, knee 0- 120 degrees to allow patient to report able to walk in house without crutches as OK per MD  2. Increase strength R LE to 4-/5 to allow patient to return to driving when OK per MD  3.  I with HEP as prescribed to allow patient to report able to sleep x 6 hours without awakening due to R knee pain. Long Term Goals:  Time Frame: 8 weeks  1. Increase AROM R hip flexion to 60, knee 0- 125 degrees to allow patient to report able to walk with normal gait pattern with no pain. 2. Increase strength R LE to 4/5 to allow patient to go up and down steps reciprocally with no pain. 3. I with HEP as prescribed to allow patient to walk x 30 minutes at grocery with no R knee pain. Patient Education:    [x]  HEP/Education Completed: gait with PWB status  []  No new Education completed  [x]  Reviewed Prior HEP      []  Patient verbalized and/or demonstrated understanding of education provided. []  Patient unable to verbalize and/or demonstrate understanding of education provided. Will continue education. [x]  Barriers to learning: none    PLAN:  Treatment Recommendations: Strengthening, Range of Motion, Balance Training, Gait Training, Stair Training, Home Exercise Program and Patient Education   Plan to see patient 2 times per week for 8weeks to address the treatment planned outlined above.   [x]  Continue with current plan of care  []  Modify plan of care as follows:    []  Hold pending physician visit  []  Discharge    Time In [phone]   Time Out 0905   Timed Code Minutes: 30min   Total Treatment Time: 30min       Electronically Signed by: Faina Carrillo, PTA [id number]

## 2021-09-28 ENCOUNTER — HOSPITAL ENCOUNTER (OUTPATIENT)
Dept: PHYSICAL THERAPY | Age: 31
Setting detail: THERAPIES SERIES
Discharge: HOME | End: 2021-09-28
Payer: COMMERCIAL

## 2021-09-28 PROCEDURE — 97110 THERAPEUTIC EXERCISES: CPT

## 2021-09-28 NOTE — PROGRESS NOTES
[address] [facility]  PHYSICAL THERAPY  [] DAILY NOTE (LAND) [] DAILY NOTE (AQUATIC ) [x] PROGRESS NOTE [] DISCHARGE NOTE    [] [address] [facility]   [x] [place] [address]    [] [facility]   [] Bryce Andrade    Date: 2021  Patient Name:  Zachery Zheng  : 1990  MRN: [id number]  CSN: [id number]    Referring Practitioner Kevin Alvarado MD   Diagnosis Sprain of anterior cruciate ligament of right knee, subsequent encounter, Other tear of medial meniscus, current injury, right knee, subsequent encounter, Tear of articular cartilage of right knee, current, subsequent encounter   Treatment Diagnosis S83.241A, S83.511A, S83.31XA   Date of Evaluation 21   Additional Pertinent History R ACL reconstruction  Dr. Theda Cranker. 2nd ACL reconstruction and meniscus repair on 21 by Dr. Christiane Law. Functional Outcome Measure Used LEFS    Functional Outcome Score eval score 3 (21)       Insurance: Primary: Payor: Pavel Lucio /  /  / ,   Secondary:    Authorization Information: 12 visits, pays at 100%, - 10/01 + 24 more visits from 9/15-11/10/21= 36 visits   Visit # 12, 12/12 for progress note + start 2nd new C9 9/15/21-11/10/21 for 24 more visits   Visits Allowed: 12   Recertification Date:    Physician Follow-Up: 2021   Physician Orders:    History of Present Illness:      SUBJECTIVE:  Patient reports compliant with 50% WB, sleeping 4-5 hours then awakening due to knee pain    OBJECTIVE:  TREATMENT   Precautions: PWB 50% with crutches 4 weeks from - 10/20, FWB  Without crutches on 10/21x2 weeks.    Pain: 0-1/10 R knee    X in shaded column indicates activity completed today   Modalities Parameters/  Location  Notes                     Manual Therapy Time/Technique  Notes                     Exercise/Intervention   Notes   Bike seat 2 5 minutes full rev  x    Heel slide with strap 20  x    Quad set 20 x 15 5 sec x AROM R knee 0- 125 degrees   DF belt stretch 5 15 sec x    Sitting EOB knee flexion 20 5     SLR 4 way 20 5 x           Bike seat 2 5 minutes  x Full revolutions fwd   Total gym Heel raises 50% PWB 2x10  x Level 4                   Specific Interventions Next Treatment: NWB R ACL protocol without meniscus repair , No restrictions with AROM, brace locked in 0 degrees at all times except with PT and HEP    Activity/Treatment Tolerance:  [x]  Patient tolerated treatment well  []  Patient limited by fatigue  []  Patient limited by pain   []  Patient limited by medical complications  []  Other:     Assessment: PT updated goals as appropriate for 50% WB until 10/20 then OK to progress to FWB 10/21. GOALS:  Patient Goal: to get my knee back to normal        REVISED GOALS  Short Term Goals:  Time Frame: 4 weeks  1. Increase AROM R hip flexion to 70, knee 0- 120 degrees to allow patient to report able to walk in house without crutches as OK per MD  2. Increase strength R LE to 4-/5 to allow patient to return to driving when OK per MD  3. I with HEP as prescribed to allow patient to report able to sleep x 6 hours without awakening due to R knee pain. Long Term Goals:  Time Frame: 12 weeks  1. Increase AROM R hip flexion to 60, knee 0- 125 degrees to allow patient to report able to walk with normal gait pattern with no pain. 2. Increase strength R LE to 4/5 to allow patient to go up and down steps reciprocally with no pain. 3. I with HEP as prescribed to allow patient to walk x 30 minutes at grocery with no R knee pain. Patient Education:    [x]  HEP/Education Completed: gait with PWB status  []  No new Education completed  [x]  Reviewed Prior HEP      []  Patient verbalized and/or demonstrated understanding of education provided. []  Patient unable to verbalize and/or demonstrate understanding of education provided. Will continue education.   [x]  Barriers to learning: none    PLAN:  Treatment Recommendations: Strengthening, Range of Motion, Balance Training, Gait Training, Stair Training, Home Exercise Program and Patient Education   Plan to see patient 2 times per week for 12 weeks to address the treatment planned outlined above.   [x]  Continue with current plan of care  []  Modify plan of care as follows:    []  Hold pending physician visit  []  Discharge    Time In 0930   Time Out 1000   Timed Code Minutes: 30min   Total Treatment Time: 30min       Electronically Signed by: Jadiel Hassan PT

## 2021-10-01 ENCOUNTER — HOSPITAL ENCOUNTER (OUTPATIENT)
Dept: PHYSICAL THERAPY | Age: 31
Setting detail: THERAPIES SERIES
Discharge: HOME | End: 2021-10-01
Payer: COMMERCIAL

## 2021-10-01 PROCEDURE — 97110 THERAPEUTIC EXERCISES: CPT

## 2021-10-01 NOTE — PROGRESS NOTES
[address] [facility]  PHYSICAL THERAPY  [x] DAILY NOTE (LAND) [] DAILY NOTE (AQUATIC ) [] PROGRESS NOTE [] DISCHARGE NOTE    [] 615 [facility]   [x] [place] [address]    [] [facility]   [] Maynor Boyce    Date: 10/1/2021  Patient Name:  Pankaj Cummins  : 1990  MRN: [id number]  CSN: [id number]    Referring Practitioner Marva Moeller MD   Diagnosis Sprain of anterior cruciate ligament of right knee, subsequent encounter, Other tear of medial meniscus, current injury, right knee, subsequent encounter, Tear of articular cartilage of right knee, current, subsequent encounter   Treatment Diagnosis S83.241A, S83.511A, S83.31XA   Date of Evaluation 21   Additional Pertinent History R ACL reconstruction  Dr. Syble Essex. 2nd ACL reconstruction and meniscus repair on 21 by Dr. Alf Sanchez. Functional Outcome Measure Used LEFS    Functional Outcome Score eval score 3 (21)       Insurance: Primary: Payor: Ángel Cristobal /  /  / ,   Secondary:    Authorization Information: 12 visits, pays at 100%, - 10/01 + 24 more visits from 9/15-11/10/21= 36 visits   Visit # 13, 1 for progress note + start 2nd new C9 9/15/21-11/10/21 for 24 more visits   Visits Allowed: 12   Recertification Date:    Physician Follow-Up: 2021   Physician Orders:    History of Present Illness:      SUBJECTIVE:  Patient reporting pain level 1-1.5/10 today and reporting no other complains at this time. OBJECTIVE:  TREATMENT   Precautions: PWB 50% with crutches 4 weeks from - 10/20, FWB  Without crutches on 10/21x2 weeks.    Pain: 1-1.5/10 R knee    X in shaded column indicates activity completed today   Modalities Parameters/  Location  Notes                     Manual Therapy Time/Technique  Notes                     Exercise/Intervention   Notes   Bike seat 2 5 minutes full rev  x    Heel slide with strap 20  x    Quad set 20 x 15 5 sec x AROM R knee 0- 125 degrees   DF belt stretch 5 15 sec x    Sitting EOB knee flexion 20 5 x    SLR 4 way 20 5 x           Total gym Heel raises 50% PWB 25  x Level 4                   Specific Interventions Next Treatment: NWB R ACL protocol without meniscus repair , No restrictions with AROM, brace locked in 0 degrees at all times except with PT and HEP    Activity/Treatment Tolerance:  [x]  Patient tolerated treatment well  []  Patient limited by fatigue  []  Patient limited by pain   []  Patient limited by medical complications  []  Other:     Assessment: Continued with exercises as noted with patient having no complains at end of session. GOALS:  Patient Goal: to get my knee back to normal        REVISED GOALS  Short Term Goals:  Time Frame: 4 weeks  1. Increase AROM R hip flexion to 70, knee 0- 120 degrees to allow patient to report able to walk in house without crutches as OK per MD  2. Increase strength R LE to 4-/5 to allow patient to return to driving when OK per MD  3. I with HEP as prescribed to allow patient to report able to sleep x 6 hours without awakening due to R knee pain. Long Term Goals:  Time Frame: 12 weeks  1. Increase AROM R hip flexion to 60, knee 0- 125 degrees to allow patient to report able to walk with normal gait pattern with no pain. 2. Increase strength R LE to 4/5 to allow patient to go up and down steps reciprocally with no pain. 3. I with HEP as prescribed to allow patient to walk x 30 minutes at grocery with no R knee pain. Patient Education:    []  HEP/Education Completed: gait with PWB status  [x]  No new Education completed  []  Reviewed Prior HEP      []  Patient verbalized and/or demonstrated understanding of education provided. []  Patient unable to verbalize and/or demonstrate understanding of education provided. Will continue education.   [x]  Barriers to learning: none    PLAN:  Treatment Recommendations: Strengthening, Range of Motion, Balance

## 2021-10-04 ENCOUNTER — HOSPITAL ENCOUNTER (OUTPATIENT)
Dept: PHYSICAL THERAPY | Age: 31
Setting detail: THERAPIES SERIES
Discharge: HOME | End: 2021-10-04
Payer: COMMERCIAL

## 2021-10-04 PROCEDURE — 97110 THERAPEUTIC EXERCISES: CPT

## 2021-10-04 NOTE — PROGRESS NOTES
[address] [facility]  PHYSICAL THERAPY  [x] DAILY NOTE (LAND) [] DAILY NOTE (AQUATIC ) [] PROGRESS NOTE [] DISCHARGE NOTE    [] 615 [facility]   [x] [place] [address]    [] [facility]   [] Shantell Cutler    Date: 10/4/2021  Patient Name:  Gus Comer  : 1990  MRN: [id number]  CSN: [id number]    Referring Practitioner Elise Laboy MD   Diagnosis Sprain of anterior cruciate ligament of right knee, subsequent encounter, Other tear of medial meniscus, current injury, right knee, subsequent encounter, Tear of articular cartilage of right knee, current, subsequent encounter   Treatment Diagnosis S83.241A, S83.511A, S83.31XA   Date of Evaluation 21   Additional Pertinent History R ACL reconstruction  Dr. Elisa James. 2nd ACL reconstruction and meniscus repair on 21 by Dr. Constance Tovar. Functional Outcome Measure Used LEFS    Functional Outcome Score eval score 3 (21)       Insurance: Primary: Payor: Homer Camacho /  /  / ,   Secondary:    Authorization Information: 12 visits, pays at 100%, - 10/01 + 24 more visits from 9/15-11/10/21= 36 visits   Visit # 14, 2/12 for progress note + start 2nd new C9 9/15/21-11/10/21 for 24 more visits   Visits Allowed: 12   Recertification Date: 58/15/41   Physician Follow-Up: 2021   Physician Orders:    History of Present Illness:      SUBJECTIVE:  Patient reporting pain level . 5/10 today, reports she may have twisted her knee slightly when trying to put her shoes on this AM.    OBJECTIVE:  TREATMENT   Precautions: PWB 50% with crutches 4 weeks from - 10/20, FWB  Without crutches on 10/21x2 weeks.    Pain: 1-1.5/10 R knee    X in shaded column indicates activity completed today   Modalities Parameters/  Location  Notes                     Manual Therapy Time/Technique  Notes                     Exercise/Intervention   Notes   Bike seat 2 5 minutes full rev  x    Heel slide with strap 20  x    Quad set 20 5 sec x AROM R knee 0- 125 degrees   DF belt stretch 5 15 sec x    Sitting EOB knee flexion 20 5 x    SLR 4 way 20 5 x           Total gym 50% PWB 2x15 HR  x10 squats  X  x Level 4  VCs for proper technique and safety                   Specific Interventions Next Treatment: NWB R ACL protocol without meniscus repair , No restrictions with AROM, brace locked in 0 degrees at all times except with PT and HEP    Activity/Treatment Tolerance:  [x]  Patient tolerated treatment well  []  Patient limited by fatigue  []  Patient limited by pain   []  Patient limited by medical complications  []  Other:     Assessment: Continued with POC as prescribed, no c/o pain or soreness after. GOALS:  Patient Goal: to get my knee back to normal    REVISED GOALS  Short Term Goals:  Time Frame: 4 weeks  1. Increase AROM R hip flexion to 70, knee 0- 120 degrees to allow patient to report able to walk in house without crutches as OK per MD  2. Increase strength R LE to 4-/5 to allow patient to return to driving when OK per MD  3. I with HEP as prescribed to allow patient to report able to sleep x 6 hours without awakening due to R knee pain. Long Term Goals:  Time Frame: 12 weeks  1. Increase AROM R hip flexion to 60, knee 0- 125 degrees to allow patient to report able to walk with normal gait pattern with no pain. 2. Increase strength R LE to 4/5 to allow patient to go up and down steps reciprocally with no pain. 3. I with HEP as prescribed to allow patient to walk x 30 minutes at grocery with no R knee pain. Patient Education:    []  HEP/Education Completed: gait with PWB status  [x]  No new Education completed  []  Reviewed Prior HEP      []  Patient verbalized and/or demonstrated understanding of education provided. []  Patient unable to verbalize and/or demonstrate understanding of education provided. Will continue education.   [x]  Barriers to learning: none    PLAN:  Treatment Recommendations: Strengthening, Range of Motion, Balance Training, Gait Training, Stair Training, Home Exercise Program and Patient Education   Plan to see patient 2 times per week for 12 weeks to address the treatment planned outlined above.   [x]  Continue with current plan of care  []  Modify plan of care as follows:    []  Hold pending physician visit  []  Discharge    Time In 96 [phone] 26   Time Out 0935   Timed Code Minutes: 30min   Total Treatment Time: 30min       Electronically Signed by: Santiago Linder, PTA [id number]

## 2021-10-06 ENCOUNTER — HOSPITAL ENCOUNTER (OUTPATIENT)
Dept: PHYSICAL THERAPY | Age: 31
Setting detail: THERAPIES SERIES
Discharge: HOME | End: 2021-10-06
Payer: COMMERCIAL

## 2021-10-06 PROCEDURE — 97110 THERAPEUTIC EXERCISES: CPT

## 2021-10-06 NOTE — PROGRESS NOTES
[address] [facility]  PHYSICAL THERAPY  [x] DAILY NOTE (LAND) [] DAILY NOTE (AQUATIC ) [] PROGRESS NOTE [] DISCHARGE NOTE    [] [facility]   [x[facility]    [] [facility]   [] Lisa Crimes    Date: 10/6/2021  Patient Name:  Fely Bridges  : 1990  MRN: [id number]  CSN: [id number]    Referring Practitioner Rizwana [facility] MD   Diagnosis Sprain of anterior cruciate ligament of right knee, subsequent encounter, Other tear of medial meniscus, current injury, right knee, subsequent encounter, Tear of articular cartilage of right knee, current, subsequent encounter   Treatment Diagnosis S83.241A, S83.511A, S83.31XA   Date of Evaluation 21   Additional Pertinent History R ACL reconstruction  Dr. Carlos Rivera. 2nd ACL reconstruction and meniscus repair on 21 by Dr. Faith Nicole. Functional Outcome Measure Used LEFS    Functional Outcome Score eval score 3 (21)       Insurance: Primary: Payor: Mikayla Britton /  /  / ,   Secondary:    Authorization Information: 12 visits, pays at 100%, - 10/01 + 24 more visits from 9/15-11/10/21= 36 visits   Visit # 15, 3/12 for progress note + start 2nd new C9 9/15/21-11/10/21 for 24 more visits   Visits Allowed: 12   Recertification Date:    Physician Follow-Up: 2021   Physician Orders:    History of Present Illness:      SUBJECTIVE:  Patient reporting pain level . 5/10 today, did notice some soreness after last session. OBJECTIVE:  TREATMENT   Precautions: PWB 50% with crutches 4 weeks from - 10/20, FWB  Without crutches on 10/21x2 weeks.    Pain: .5/10 R knee    X in shaded column indicates activity completed today   Modalities Parameters/  Location  Notes                     Manual Therapy Time/Technique  Notes                     Exercise/Intervention   Notes   Bike seat 2 5 minutes full rev  x    Heel slide with strap 20  x    Quad set 20 5 sec x AROM R knee 0- 125 degrees   DF belt stretch 5 15 sec x    Sitting EOB knee flexion 20 5 x    SLR 4 way 20 5            Total gym 50% PWB 2x15 HR  2x10 squats  X  x Level 4  VCs for proper technique and safety, pain free range   Standing hip 4 way with tband 10  x grn band, L LE on foam pad            Specific Interventions Next Treatment: NWB R ACL protocol without meniscus repair , No restrictions with AROM, brace locked in 0 degrees at all times except with PT and HEP    Activity/Treatment Tolerance:  [x]  Patient tolerated treatment well  []  Patient limited by fatigue  []  Patient limited by pain   []  Patient limited by medical complications  []  Other:     Assessment: pt tolerated session well, progressed SLR with standing hip 4 way with theraband. Mod fatigue noted with full WB L LE, VCs for upright posture and decrease UE support. GOALS:  Patient Goal: to get my knee back to normal    REVISED GOALS  Short Term Goals:  Time Frame: 4 weeks  1. Increase AROM R hip flexion to 70, knee 0- 120 degrees to allow patient to report able to walk in house without crutches as OK per MD  2. Increase strength R LE to 4-/5 to allow patient to return to driving when OK per MD  3. I with HEP as prescribed to allow patient to report able to sleep x 6 hours without awakening due to R knee pain. Long Term Goals:  Time Frame: 12 weeks  1. Increase AROM R hip flexion to 60, knee 0- 125 degrees to allow patient to report able to walk with normal gait pattern with no pain. 2. Increase strength R LE to 4/5 to allow patient to go up and down steps reciprocally with no pain. 3. I with HEP as prescribed to allow patient to walk x 30 minutes at grocery with no R knee pain. Patient Education:    []  HEP/Education Completed: gait with PWB status  [x]  No new Education completed  []  Reviewed Prior HEP      []  Patient verbalized and/or demonstrated understanding of education provided.   []  Patient unable to verbalize and/or demonstrate understanding of education provided. Will continue education. [x]  Barriers to learning: none    PLAN:  Treatment Recommendations: Strengthening, Range of Motion, Balance Training, Gait Training, Stair Training, Home Exercise Program and Patient Education   Plan to see patient 2 times per week for 12 weeks to address the treatment planned outlined above.   [x]  Continue with current plan of care  []  Modify plan of care as follows:    []  Hold pending physician visit  []  Discharge    Time In [phone]   Time Out 7313   Timed Code Minutes: 27min   Total Treatment Time: 27min       Electronically Signed by: Shawna Vela, PTA [id number]

## 2021-10-11 ENCOUNTER — HOSPITAL ENCOUNTER (OUTPATIENT)
Dept: PHYSICAL THERAPY | Age: 31
Setting detail: THERAPIES SERIES
Discharge: HOME | End: 2021-10-11
Payer: COMMERCIAL

## 2021-10-11 PROCEDURE — 97110 THERAPEUTIC EXERCISES: CPT

## 2021-10-11 NOTE — PROGRESS NOTES
[address] [facility]  PHYSICAL THERAPY  [x] DAILY NOTE (LAND) [] DAILY NOTE (AQUATIC ) [] PROGRESS NOTE [] DISCHARGE NOTE    [] 615 [facility]   [x] [place]    [] [facility]   [] Nicole Hernandez    Date: 10/11/2021  Patient Name:  Merlin Pick  : 1990  MRN: [id number]  CSN: [id number]    Referring Practitioner Haley Bran MD   Diagnosis Sprain of anterior cruciate ligament of right knee, subsequent encounter, Other tear of medial meniscus, current injury, right knee, subsequent encounter, Tear of articular cartilage of right knee, current, subsequent encounter   Treatment Diagnosis S83.241A, S83.511A, S83.31XA   Date of Evaluation 21   Additional Pertinent History R ACL reconstruction  Dr. Keven Bocanegra. 2nd ACL reconstruction and meniscus repair on 21 by Dr. Katelyn Gage. Functional Outcome Measure Used LEFS    Functional Outcome Score eval score 3 (21)       Insurance: Primary: Payor: Roseline Scott /  /  / ,   Secondary:    Authorization Information: 12 visits, pays at 100%, - 10/01 + 24 more visits from 9/15-11/10/21= 36 visits   Visit # 16, 4/12 for progress note + start 2nd new C9 9/15/21-11/10/21 for 24 more visits   Visits Allowed: 36   Recertification Date:    Physician Follow-Up: 2021   Physician Orders: No RTW until after 21 f/u with MD   History of Present Illness:      SUBJECTIVE:  Soreness in knee 1/10, ready to be finished with crutches. OBJECTIVE:  TREATMENT   Precautions: PWB 50% with crutches 4 weeks from - 10/20, FWB  Without crutches on 10/21x2 weeks.    Pain: .5/10 R knee    X in shaded column indicates activity completed today   Modalities Parameters/  Location  Notes                     Manual Therapy Time/Technique  Notes                     Exercise/Intervention   Notes   Bike seat 2 5 minutes full rev  x    Heel slide with strap 25  x    Quad set 25 5 sec x AROM R knee 0- 127 degrees   DF belt stretch 5 15 sec x    Sitting EOB knee flexion 25 5 x    SLR 4 way 20 5            Total gym 50% PWB 25 HR  25 squats  X  x Level 4  VCs for proper technique and safety, pain free range   Standing hip 4 way with tband 15  x grn band, L LE on foam pad            Specific Interventions Next Treatment: NWB R ACL protocol without meniscus repair , No restrictions with AROM, brace locked in 0 degrees at all times except with PT and HEP    Activity/Treatment Tolerance:  [x]  Patient tolerated treatment well  []  Patient limited by fatigue  []  Patient limited by pain   []  Patient limited by medical complications  []  Other:     Assessment:keep same program until 10/20 when OK to do WBAT, 10/18 still 50% WB    GOALS:  Patient Goal: to get my knee back to normal    REVISED GOALS  Short Term Goals:  Time Frame: 4 weeks  1. Increase AROM R hip flexion to 70, knee 0- 120 degrees to allow patient to report able to walk in house without crutches as OK per MD  2. Increase strength R LE to 4-/5 to allow patient to return to driving when OK per MD  3. I with HEP as prescribed to allow patient to report able to sleep x 6 hours without awakening due to R knee pain. Long Term Goals:  Time Frame: 12 weeks  1. Increase AROM R hip flexion to 60, knee 0- 125 degrees to allow patient to report able to walk with normal gait pattern with no pain. 2. Increase strength R LE to 4/5 to allow patient to go up and down steps reciprocally with no pain. 3. I with HEP as prescribed to allow patient to walk x 30 minutes at grocery with no R knee pain. Patient Education:    []  HEP/Education Completed: gait with PWB status  [x]  No new Education completed  []  Reviewed Prior HEP      []  Patient verbalized and/or demonstrated understanding of education provided. []  Patient unable to verbalize and/or demonstrate understanding of education provided. Will continue education.   [x]  Barriers to learning: none    PLAN:  Treatment Recommendations: Strengthening, Range of Motion, Balance Training, Gait Training, Stair Training, Home Exercise Program and Patient Education   Plan to see patient 2 times per week for 12 weeks to address the treatment planned outlined above.   [x]  Continue with current plan of care  []  Modify plan of care as follows:    []  Hold pending physician visit  []  Discharge    Time In 0930   Time Out 1000   Timed Code Minutes: 30 min   Total Treatment Time: 27min       Electronically Signed by: Hanane Martin PT

## 2021-10-15 ENCOUNTER — HOSPITAL ENCOUNTER (OUTPATIENT)
Dept: PHYSICAL THERAPY | Age: 31
Setting detail: THERAPIES SERIES
Discharge: HOME | End: 2021-10-15
Payer: COMMERCIAL

## 2021-10-15 PROCEDURE — 97110 THERAPEUTIC EXERCISES: CPT

## 2021-10-15 NOTE — PROGRESS NOTES
[address] [facility]  PHYSICAL THERAPY  [x] DAILY NOTE (LAND) [] DAILY NOTE (AQUATIC ) [] PROGRESS NOTE [] DISCHARGE NOTE    [] [address] [facility]   [x] [address]    [] [facility]   [] Digna Salas    Date: 10/15/2021  Patient Name:  Azeb Borden  : 1990  MRN: [id number]  CSN: [id number]    Referring Practitioner Maegan Armas MD   Diagnosis Sprain of anterior cruciate ligament of right knee, subsequent encounter, Other tear of medial meniscus, current injury, right knee, subsequent encounter, Tear of articular cartilage of right knee, current, subsequent encounter   Treatment Diagnosis S83.241A, S83.511A, S83.31XA   Date of Evaluation 21   Additional Pertinent History R ACL reconstruction  Dr. Maximo Trujillo. 2nd ACL reconstruction and meniscus repair on 21 by Dr. Seven Patten. Functional Outcome Measure Used LEFS    Functional Outcome Score eval score 3 (21)       Insurance: Primary: Payor: Lucy Rodríguez /  /  / ,   Secondary:    Authorization Information: 12 visits, pays at 100%, - 10/01 + 24 more visits from 9/15-11/10/21= 36 visits   Visit # 17, 5/12 for progress note + start 2nd new C9 9/15/21-11/10/21 for 24 more visits   Visits Allowed: 39   Recertification Date:    Physician Follow-Up: 2021   Physician Orders: No RTW until after 21 f/u with MD   History of Present Illness:      SUBJECTIVE:  Soreness 1-2/10 and stating that she thinks knee is more sore from the weather. OBJECTIVE:  TREATMENT   Precautions: PWB 50% with crutches 4 weeks from - 10/20, FWB  Without crutches on 10/21x2 weeks.    Pain: 1-2/10 R knee    X in shaded column indicates activity completed today   Modalities Parameters/  Location  Notes                     Manual Therapy Time/Technique  Notes                     Exercise/Intervention   Notes   Bike seat 2 5 minutes full rev  x    Heel slide with strap 25  x    Quad set 25 5 sec x AROM R knee 0- 127 degrees   DF belt stretch 5 15 sec x    Sitting EOB knee flexion 25 5     SLR 4 way 20 5 x    HS stretch at step  3x 20 sec  x    Total gym 50% PWB 25 HR  25 squats  X  x Level 4  VCs for proper technique and safety, pain free range   Standing hip 4 way with tband 15  x grn band, L LE on foam pad            Specific Interventions Next Treatment: NWB R ACL protocol without meniscus repair , No restrictions with AROM, brace locked in 0 degrees at all times except with PT and HEP    Activity/Treatment Tolerance:  [x]  Patient tolerated treatment well  []  Patient limited by fatigue  []  Patient limited by pain   []  Patient limited by medical complications  []  Other:     Assessment: Performed standing HS stretch with good tolerance. Patient reporting no complains at end of session. GOALS:  Patient Goal: to get my knee back to normal    REVISED GOALS  Short Term Goals:  Time Frame: 4 weeks  1. Increase AROM R hip flexion to 70, knee 0- 120 degrees to allow patient to report able to walk in house without crutches as OK per MD  2. Increase strength R LE to 4-/5 to allow patient to return to driving when OK per MD  3. I with HEP as prescribed to allow patient to report able to sleep x 6 hours without awakening due to R knee pain. Long Term Goals:  Time Frame: 12 weeks  1. Increase AROM R hip flexion to 60, knee 0- 125 degrees to allow patient to report able to walk with normal gait pattern with no pain. 2. Increase strength R LE to 4/5 to allow patient to go up and down steps reciprocally with no pain. 3. I with HEP as prescribed to allow patient to walk x 30 minutes at grocery with no R knee pain. Patient Education:    [x]  HEP/Education Completed: HS stretch  []  No new Education completed  []  Reviewed Prior HEP      []  Patient verbalized and/or demonstrated understanding of education provided.   []  Patient unable to verbalize and/or demonstrate understanding of education provided. Will continue education. [x]  Barriers to learning: none    PLAN:  Treatment Recommendations: Strengthening, Range of Motion, Balance Training, Gait Training, Stair Training, Home Exercise Program and Patient Education   Plan to see patient 2 times per week for 12 weeks to address the treatment planned outlined above.   [x]  Continue with current plan of care  []  Modify plan of care as follows:    []  Hold pending physician visit  []  Discharge    Time In 912   Time Out 938   Timed Code Minutes: 26 min   Total Treatment Time: 26min       Electronically Signed by: Kinjal Moore PTA

## 2021-10-18 ENCOUNTER — HOSPITAL ENCOUNTER (OUTPATIENT)
Dept: PHYSICAL THERAPY | Age: 31
Setting detail: THERAPIES SERIES
Discharge: HOME | End: 2021-10-18
Payer: COMMERCIAL

## 2021-10-18 PROCEDURE — 97110 THERAPEUTIC EXERCISES: CPT

## 2021-10-18 NOTE — PROGRESS NOTES
[address] [facility]  PHYSICAL THERAPY  [x] DAILY NOTE (LAND) [] DAILY NOTE (AQUATIC ) [] PROGRESS NOTE [] DISCHARGE NOTE    [] [address] [facility]   [x] [address]    [] [facility]   [place] Randchristofer Erps    Date: 10/18/2021  Patient Name:  Heide Garnica  : 1990  MRN: [id number]  CSN: [id number]    Referring Practitioner Tiera Verde MD   Diagnosis Sprain of anterior cruciate ligament of right knee, subsequent encounter, Other tear of medial meniscus, current injury, right knee, subsequent encounter, Tear of articular cartilage of right knee, current, subsequent encounter   Treatment Diagnosis S83.241A, S83.511A, S83.31XA   Date of Evaluation 21   Additional Pertinent History R ACL reconstruction  Dr. Gifty Baron. 2nd ACL reconstruction and meniscus repair on 21 by Dr. Sarah Goetz. Functional Outcome Measure Used LEFS    Functional Outcome Score eval score 3 (21)       Insurance: Primary: Payor: Jeremy Garza /  /  / ,   Secondary:    Authorization Information: 12 visits, pays at 100%, - 10/01 + 24 more visits from 9/15-11/10/21= 36 visits   Visit # 18,  for progress note + start 2nd new C9 9/15/21-11/10/21 for 24 more visits   Visits Allowed: 36   Recertification Date:    Physician Follow-Up: 2021   Physician Orders: No RTW until after 21 f/u with MD   History of Present Illness:      SUBJECTIVE:  Pt reports she had a busy weekend, went to a football game and walked from car to stands and up steps with no issues. Soreness /10 today in R knee. Pt arriving 10 min late to session. OBJECTIVE:  TREATMENT   Precautions: PWB 50% with crutches 4 weeks from - 10/20, FWB  Without crutches on 10/21x2 weeks.    Pain: 1-2/10 R knee    X in shaded column indicates activity completed today   Modalities Parameters/  Location  Notes                     Manual Therapy Time/Technique  Notes Education:   Alfred Mann []  HEP/Education Completed: HS stretch  []  No new Education completed  [x]  Reviewed Prior HEP      [x]  Patient verbalized and/or demonstrated understanding of education provided. []  Patient unable to verbalize and/or demonstrate understanding of education provided. Will continue education. [x]  Barriers to learning: none    PLAN:  Treatment Recommendations: Strengthening, Range of Motion, Balance Training, Gait Training, Stair Training, Home Exercise Program and Patient Education   Plan to see patient 2 times per week for 12 weeks to address the treatment planned outlined above.   [x]  Continue with current plan of care  []  Modify plan of care as follows:    []  Hold pending physician visit  []  Discharge    Time In 910   Time Out 940   Timed Code Minutes: 30 min   Total Treatment Time: 30 min       Electronically Signed by: Shanae Arredondo, PTA [id number]

## 2021-10-20 ENCOUNTER — HOSPITAL ENCOUNTER (OUTPATIENT)
Dept: PHYSICAL THERAPY | Age: 31
Setting detail: THERAPIES SERIES
Discharge: HOME | End: 2021-10-20
Payer: COMMERCIAL

## 2021-10-20 PROCEDURE — 97110 THERAPEUTIC EXERCISES: CPT

## 2021-10-20 NOTE — PROGRESS NOTES
[address] [facility]  PHYSICAL THERAPY  [x] DAILY NOTE (LAND) [] DAILY NOTE (AQUATIC ) [] PROGRESS NOTE [] DISCHARGE NOTE    [] [address] [facility]   [x] [address]    [] [facility]   [] Carleen Clark    Date: 10/20/2021  Patient Name:  Johnathon Slater  : 1990  MRN: [id number]  CSN: [id number]    Referring Practitioner Tracie Reilly MD   Diagnosis Sprain of anterior cruciate ligament of right knee, subsequent encounter, Other tear of medial meniscus, current injury, right knee, subsequent encounter, Tear of articular cartilage of right knee, current, subsequent encounter   Treatment Diagnosis S83.241A, S83.511A, S83.31XA   Date of Evaluation 21   Additional Pertinent History R ACL reconstruction  Dr. Dangelo Mae. 2nd ACL reconstruction and meniscus repair on 21 by Dr. Frances Diehl. Functional Outcome Measure Used LEFS    Functional Outcome Score eval score 3 (21)       Insurance: Primary: Payor: Tonny Browning /  /  / ,   Secondary:    Authorization Information: 12 visits, pays at 100%, - 10/01 + 24 more visits from 9/15-11/10/21= 36 visits   Visit # 19, 7/12 for progress note + start 2nd new C9 9/15/21-11/10/21 for 24 more visits   Visits Allowed: 36   Recertification Date:    Physician Follow-Up: 2021   Physician Orders: No RTW until after 21 f/u with MD   History of Present Illness:      SUBJECTIVE:  Pt reports reporting knee is a little sore today but is reporting that her back is bothering her more then anything. OBJECTIVE:  TREATMENT   Precautions: PWB 50% with crutches 4 weeks from - 10/20, FWB  Without crutches on 10/21x2 weeks.    Pain: 1-2/10 R knee    X in shaded column indicates activity completed today   Modalities Parameters/  Location  Notes                     Manual Therapy Time/Technique  Notes                     Exercise/Intervention   Notes   Bike seat 2 5 minutes full rev  x Heel slide with strap 25  x    Quad set 25 5 sec x AROM R knee 0- 128 degrees   DF belt stretch 5 15 sec x    Sitting EOB knee flexion 25 5     SLR 4 way 20 5 x    HS stretch at step  3x 20 sec      Total gym FWB 25 squats  X      Standing hip 4 way with tband 15   grn band, L LE on foam pad   Heel raises  10  x    Weight shifts S/S 10  x    Standing TKE  10 5 x    Gait with 2 crutches still: cues for heel strike and toe off    x      Specific Interventions Next Treatment: NWB R ACL protocol without meniscus repair , No restrictions with AROM, brace locked in 0 degrees at all times except with PT and HEP    Activity/Treatment Tolerance:  [x]  Patient tolerated treatment well  []  Patient limited by fatigue  []  Patient limited by pain   []  Patient limited by medical complications  []  Other:     Assessment: Patient a little apprehensive about putting full weight through right leg with standing exercises and then getting frustrated and upset when she wasn't able to go full weight bearing. Patient getting teary eyed at end of session because of not being able to full weight bear and support given. GOALS:  Patient Goal: to get my knee back to normal    REVISED GOALS  Short Term Goals:  Time Frame: 4 weeks  1. Increase AROM R hip flexion to 70, knee 0- 120 degrees to allow patient to report able to walk in house without crutches as OK per MD  2. Increase strength R LE to 4-/5 to allow patient to return to driving when OK per MD  3. I with HEP as prescribed to allow patient to report able to sleep x 6 hours without awakening due to R knee pain. Long Term Goals:  Time Frame: 12 weeks  1. Increase AROM R hip flexion to 60, knee 0- 125 degrees to allow patient to report able to walk with normal gait pattern with no pain. 2. Increase strength R LE to 4/5 to allow patient to go up and down steps reciprocally with no pain.     3. I with HEP as prescribed to allow patient to walk x 30 minutes at grocery with no R knee pain. Patient Education:    [x]  HEP/Education Completed: FWB  []  No new Education completed  []  Reviewed Prior HEP      [x]  Patient verbalized and/or demonstrated understanding of education provided. []  Patient unable to verbalize and/or demonstrate understanding of education provided. Will continue education. [x]  Barriers to learning: none    PLAN:  Treatment Recommendations: Strengthening, Range of Motion, Balance Training, Gait Training, Stair Training, Home Exercise Program and Patient Education   Plan to see patient 2 times per week for 12 weeks to address the treatment planned outlined above.   [x]  Continue with current plan of care  []  Modify plan of care as follows:    []  Hold pending physician visit  []  Discharge    Time In 912   Time Out 939   Timed Code Minutes: 27 min   Total Treatment Time: 27 min       Electronically Signed by: Brandie Acevedo, PTA [id number]

## 2021-10-25 ENCOUNTER — HOSPITAL ENCOUNTER (OUTPATIENT)
Dept: PHYSICAL THERAPY | Age: 31
Setting detail: THERAPIES SERIES
Discharge: HOME | End: 2021-10-25
Payer: COMMERCIAL

## 2021-10-25 PROCEDURE — 97110 THERAPEUTIC EXERCISES: CPT

## 2021-10-25 NOTE — PROGRESS NOTES
Time/Technique  Notes                     Exercise/Intervention   Notes   Bike seat 2 5 minutes full rev  x    Heel slide with strap 25      Quad set 25 5 sec  AROM R knee 0- 128 degrees   DF belt stretch 5 15 sec     Sitting EOB knee flexion 25 5     SLR 4 way 20 5     HS stretch at step  3x 20 sec  x    Total gym FWB 25 squats  X   Level 8   Standing hip 4 way with tband 15  x grn band, R LE   Heel raises  15  x    Weight shifts S/S, step stance B  1 min ea x Increased pain with step stance   Standing TKE  10 5 x With grn tband   Step up/off 2inch with one crutch   x edu and demo'd x2   Gait with 2 crutches still: cues for heel strike and toe off    x Pt amb in clinic with one crutch this date, encouraged to use on crutch at home     Specific Interventions Next Treatment: NWB R ACL protocol without meniscus repair , No restrictions with AROM, brace locked in 0 degrees at all times except with PT and HEP    Activity/Treatment Tolerance:  []  Patient tolerated treatment well  []  Patient limited by fatigue  [x]  Patient limited by pain   []  Patient limited by medical complications  []  Other:     Assessment: Pt tolerated session fair, increased pain with WB exs, noticeable heavy reliance on AD with UEs d/t apprehension of FWB. Green tband given for HEP- hip 4 way. Added weight bearing s/s and B step stance to HEP. Discussed proper step safety with FWB/crutch. Plan to continue with standing exercises to increase overall strength and mobility for pt to decrease use of AD per doctor's orders. GOALS:  Patient Goal: to get my knee back to normal    REVISED GOALS  Short Term Goals:  Time Frame: 4 weeks  1. Increase AROM R hip flexion to 70, knee 0- 120 degrees to allow patient to report able to walk in house without crutches as OK per MD  2. Increase strength R LE to 4-/5 to allow patient to return to driving when OK per MD  3.  I with HEP as prescribed to allow patient to report able to sleep x 6 hours without awakening due to R knee pain. Long Term Goals:  Time Frame: 12 weeks  1. Increase AROM R hip flexion to 60, knee 0- 125 degrees to allow patient to report able to walk with normal gait pattern with no pain. 2. Increase strength R LE to 4/5 to allow patient to go up and down steps reciprocally with no pain. 3. I with HEP as prescribed to allow patient to walk x 30 minutes at grocery with no R knee pain. Patient Education:    [x]  HEP/Education Completed: FWB, step stance, tband standing hip 4way  []  No new Education completed  []  Reviewed Prior HEP      [x]  Patient verbalized and/or demonstrated understanding of education provided. []  Patient unable to verbalize and/or demonstrate understanding of education provided. Will continue education. [x]  Barriers to learning: none    PLAN:  Treatment Recommendations: Strengthening, Range of Motion, Balance Training, Gait Training, Stair Training, Home Exercise Program and Patient Education   Plan to see patient 2 times per week for 12 weeks to address the treatment planned outlined above.   [x]  Continue with current plan of care  []  Modify plan of care as follows:    []  Hold pending physician visit  []  Discharge    Time In 905   Time Out 935   Timed Code Minutes: 30 min   Total Treatment Time: 30 min       Electronically Signed by: Sonny Hubbard PTA [id number]

## 2021-10-26 ENCOUNTER — OFFICE VISIT (OUTPATIENT)
Dept: FAMILY MEDICINE CLINIC | Age: 31
End: 2021-10-26
Payer: COMMERCIAL

## 2021-10-26 VITALS
HEIGHT: 66 IN | SYSTOLIC BLOOD PRESSURE: 110 MMHG | HEART RATE: 60 BPM | WEIGHT: 271.4 LBS | BODY MASS INDEX: 43.62 KG/M2 | TEMPERATURE: 97.9 F | RESPIRATION RATE: 14 BRPM | DIASTOLIC BLOOD PRESSURE: 76 MMHG

## 2021-10-26 DIAGNOSIS — R63.5 WEIGHT GAIN: ICD-10-CM

## 2021-10-26 DIAGNOSIS — F32.A DEPRESSION, UNSPECIFIED DEPRESSION TYPE: ICD-10-CM

## 2021-10-26 DIAGNOSIS — Z00.00 ANNUAL PHYSICAL EXAM: Primary | ICD-10-CM

## 2021-10-26 DIAGNOSIS — E66.01 MORBID OBESITY (HCC): ICD-10-CM

## 2021-10-26 DIAGNOSIS — E88.81 INSULIN RESISTANCE: ICD-10-CM

## 2021-10-26 DIAGNOSIS — R41.840 LACK OF CONCENTRATION: ICD-10-CM

## 2021-10-26 PROCEDURE — 99204 OFFICE O/P NEW MOD 45 MIN: CPT | Performed by: FAMILY MEDICINE

## 2021-10-26 RX ORDER — CYCLOBENZAPRINE HCL 10 MG
TABLET ORAL PRN
COMMUNITY
Start: 2021-08-11 | End: 2021-10-26

## 2021-10-26 ASSESSMENT — PATIENT HEALTH QUESTIONNAIRE - PHQ9
SUM OF ALL RESPONSES TO PHQ QUESTIONS 1-9: 10
1. LITTLE INTEREST OR PLEASURE IN DOING THINGS: 1
10. IF YOU CHECKED OFF ANY PROBLEMS, HOW DIFFICULT HAVE THESE PROBLEMS MADE IT FOR YOU TO DO YOUR WORK, TAKE CARE OF THINGS AT HOME, OR GET ALONG WITH OTHER PEOPLE: 0
2. FEELING DOWN, DEPRESSED OR HOPELESS: 2
SUM OF ALL RESPONSES TO PHQ QUESTIONS 1-9: 10
4. FEELING TIRED OR HAVING LITTLE ENERGY: 1
5. POOR APPETITE OR OVEREATING: 0
SUM OF ALL RESPONSES TO PHQ QUESTIONS 1-9: 10
9. THOUGHTS THAT YOU WOULD BE BETTER OFF DEAD, OR OF HURTING YOURSELF: 0
6. FEELING BAD ABOUT YOURSELF - OR THAT YOU ARE A FAILURE OR HAVE LET YOURSELF OR YOUR FAMILY DOWN: 3
7. TROUBLE CONCENTRATING ON THINGS, SUCH AS READING THE NEWSPAPER OR WATCHING TELEVISION: 3
SUM OF ALL RESPONSES TO PHQ9 QUESTIONS 1 & 2: 3
8. MOVING OR SPEAKING SO SLOWLY THAT OTHER PEOPLE COULD HAVE NOTICED. OR THE OPPOSITE, BEING SO FIGETY OR RESTLESS THAT YOU HAVE BEEN MOVING AROUND A LOT MORE THAN USUAL: 0
3. TROUBLE FALLING OR STAYING ASLEEP: 0

## 2021-10-26 ASSESSMENT — COLUMBIA-SUICIDE SEVERITY RATING SCALE - C-SSRS
3. HAVE YOU BEEN THINKING ABOUT HOW YOU MIGHT KILL YOURSELF?: NO
2. HAVE YOU ACTUALLY HAD ANY THOUGHTS OF KILLING YOURSELF?: YES
5. HAVE YOU STARTED TO WORK OUT OR WORKED OUT THE DETAILS OF HOW TO KILL YOURSELF? DO YOU INTEND TO CARRY OUT THIS PLAN?: NO
1. WITHIN THE PAST MONTH, HAVE YOU WISHED YOU WERE DEAD OR WISHED YOU COULD GO TO SLEEP AND NOT WAKE UP?: NO
4. HAVE YOU HAD THESE THOUGHTS AND HAD SOME INTENTION OF ACTING ON THEM?: NO
6. HAVE YOU EVER DONE ANYTHING, STARTED TO DO ANYTHING, OR PREPARED TO DO ANYTHING TO END YOUR LIFE?: NO

## 2021-10-26 NOTE — PATIENT INSTRUCTIONS
Patient Education        Well Visit, Ages 25 to 48: Care Instructions  Overview     Well visits can help you stay healthy. Your doctor has checked your overall health and may have suggested ways to take good care of yourself. Your doctor also may have recommended tests. At home, you can help prevent illness with healthy eating, regular exercise, and other steps. Follow-up care is a key part of your treatment and safety. Be sure to make and go to all appointments, and call your doctor if you are having problems. It's also a good idea to know your test results and keep a list of the medicines you take. How can you care for yourself at home? · Get screening tests that you and your doctor decide on. Screening helps find diseases before any symptoms appear. · Eat healthy foods. Choose fruits, vegetables, whole grains, protein, and low-fat dairy foods. Limit fat, especially saturated fat. Reduce salt in your diet. · Limit alcohol. If you are a man, have no more than 2 drinks a day or 14 drinks a week. If you are a woman, have no more than 1 drink a day or 7 drinks a week. · Get at least 30 minutes of physical activity on most days of the week. Walking is a good choice. You also may want to do other activities, such as running, swimming, cycling, or playing tennis or team sports. Discuss any changes in your exercise program with your doctor. · Reach and stay at a healthy weight. This will lower your risk for many problems, such as obesity, diabetes, heart disease, and high blood pressure. · Do not smoke or allow others to smoke around you. If you need help quitting, talk to your doctor about stop-smoking programs and medicines. These can increase your chances of quitting for good. · Care for your mental health. It is easy to get weighed down by worry and stress. Learn strategies to manage stress, like deep breathing and mindfulness, and stay connected with your family and community.  If you find you often feel sad or hopeless, talk with your doctor. Treatment can help. · Talk to your doctor about whether you have any risk factors for sexually transmitted infections (STIs). You can help prevent STIs if you wait to have sex with a new partner (or partners) until you've each been tested for STIs. It also helps if you use condoms (male or female condoms) and if you limit your sex partners to one person who only has sex with you. Vaccines are available for some STIs, such as HPV. · Use birth control if it's important to you to prevent pregnancy. Talk with your doctor about the choices available and what might be best for you. · If you think you may have a problem with alcohol or drug use, talk to your doctor. This includes prescription medicines (such as amphetamines and opioids) and illegal drugs (such as cocaine and methamphetamine). Your doctor can help you figure out what type of treatment is best for you. · Protect your skin from too much sun. When you're outdoors from 10 a.m. to 4 p.m., stay in the shade or cover up with clothing and a hat with a wide brim. Wear sunglasses that block UV rays. Even when it's cloudy, put broad-spectrum sunscreen (SPF 30 or higher) on any exposed skin. · See a dentist one or two times a year for checkups and to have your teeth cleaned. · Wear a seat belt in the car. When should you call for help? Watch closely for changes in your health, and be sure to contact your doctor if you have any problems or symptoms that concern you. Where can you learn more? Go to https://[employer].healthThe [employer]. org and sign in to your [employer] account. Enter P072 in the [employer] box to learn more about \"Well Visit, Ages 25 to 48: Care Instructions. \"     If you do not have an account, please click on the \"Sign Up Now\" link. Current as of: February 11, 2021               Content Version: 13.0  © 2246-6503 Healthwise, Incorporated.    Care instructions adapted under license by [facility]. If you have questions about a medical condition or this instruction, always ask your healthcare professional. [place] [id number] any warranty or liability for your use of this information.

## 2021-10-27 ENCOUNTER — HOSPITAL ENCOUNTER (OUTPATIENT)
Dept: PHYSICAL THERAPY | Age: 31
Setting detail: THERAPIES SERIES
Discharge: HOME | End: 2021-10-27
Payer: COMMERCIAL

## 2021-10-27 PROCEDURE — 97110 THERAPEUTIC EXERCISES: CPT

## 2021-10-27 ASSESSMENT — ENCOUNTER SYMPTOMS
BLOOD IN STOOL: 0
SHORTNESS OF BREATH: 0
ABDOMINAL PAIN: 0
DIARRHEA: 0
EYES NEGATIVE: 1
VOMITING: 0
NAUSEA: 0

## 2021-10-27 NOTE — PROGRESS NOTES
[place]  PHYSICAL THERAPY  [] DAILY NOTE (LAND) [] DAILY NOTE (AQUATIC ) [x] PROGRESS NOTE [] DISCHARGE NOTE    [] [address] [facility]   [x] [address]    [] [facility]   [] Mahin Robison    Date: 10/27/2021  Patient Name:  Rome Gilliam  : 1990  MRN: [id number]  CSN: [id number]    Referring Practitioner Marie Franco MD   Diagnosis Sprain of anterior cruciate ligament of right knee, subsequent encounter, Other tear of medial meniscus, current injury, right knee, subsequent encounter, Tear of articular cartilage of right knee, current, subsequent encounter   Treatment Diagnosis S83.241A, S83.511A, S83.31XA   Date of Evaluation 21   Additional Pertinent History R ACL reconstruction  Dr. Nora Luna. 2nd ACL reconstruction and meniscus repair on 21 by Dr. Shabbir James.       Functional Outcome Measure Used LEFS    Functional Outcome Score eval score 3 (21)       Insurance: Primary: Payor: Dayanara Jacobs /  /  / ,   Secondary:    Authorization Information: 12 visits, pays at 100%, - 10/01 + 24 more visits from 9/15-11/10/21= 36 visits   Visit # 22, 9/10 for progress note + start 2nd new C9 9/15/21-11/10/21 for 24 more visits   Visits Allowed:    Recertification Date:    Physician Follow-Up: 2021   Physician Orders: No RTW until after 21 f/u with MD   History of Present Illness:      SUBJECTIVE: patient reports walking     OBJECTIVE:  TREATMENT   Precautions: PWB 50% with crutches 4 weeks from - 10/20, FWB  Without crutches on 10/21   Pain: 2/10 R knee walking with 1 crutch, 4/10 walking without crutch with moderate antalgia    X in shaded column indicates activity completed today   Modalities Parameters/  Location  Notes                     Manual Therapy Time/Technique  Notes                     Exercise/Intervention   Notes   Bike seat 2 5 minutes full rev  x    Heel slide with strap 30 Quad set 25 5 sec  AROM R knee 0- 130 degrees   DF belt stretch 5 15 sec     Sitting EOB knee flexion 25 5     SLR 4 way 20 5     HS stretch at step  3x 20 sec  x    Total gym FWB 25 squats  X   Level 8   Standing hip 4 way with tband 15  x grn band, R LE   Heel raises  15  x    Weight shifts S/S, step stance B  1 min ea x Increased pain with step stance   Standing TKE  10 5 x With grn tband   Step up/off 2inch with one crutch   x edu and demo'd x2   Gait with 2 crutches still: cues for heel strike and toe off    x Pt amb in clinic with one crutch this date, encouraged to use on crutch at home     Specific Interventions Next Treatment: NWB R ACL protocol without meniscus repair , No restrictions with AROM, brace locked in 0 degrees at all times except with PT and HEP    Activity/Treatment Tolerance:  []  Patient tolerated treatment well  []  Patient limited by fatigue  [x]  Patient limited by pain   []  Patient limited by medical complications  []  Other:     Assessment: patient just starting FWB R LE on 10/21, pain 4-5/10 and moderate antalgia noted with WB R LE, practiced with cane to allow straighter gait as progressing with strengthening    GOALS:  Patient Goal: to get my knee back to normal    REVISED GOALS  Short Term Goals:  Time Frame: 4 weeks  1. Increase AROM R hip flexion to 70, knee 0- 120 degrees to allow patient to report able to walk in house without crutches as OK per MD.  MET R HIP FLEXION 80, KNEE 0- 130 DEGREES, WALKING IN BEDROOM WITHOUT CRUTCH BUT PAIN 4-5/10 WALKING WITHOUT CRUTCH, 2/10 WITH 1 CRUTCH  2. Increase strength R LE to 4-/5 to allow patient to return to driving when OK per MD.  MET R LE 4-/5, DRIVING WITHOUT INCREASED PAINt  3. I with HEP as prescribed to allow patient to report able to sleep x 6 hours without awakening due to R knee pain. MET I WITH HEP BUT ONLY DOING 1 X PER DAY, SLEEPING THROUGH NIGHT WITHOUT AWAKENING DUE TO PAIN    Long Term Goals:  Time Frame: 12 weeks  1. Increase AROM R hip flexion to 60, knee 0- 125 degrees to allow patient to report able to walk with normal gait pattern with no pain. MET FOR AROM SO WILL UPDATE  2. Increase strength R LE to 4/5 to allow patient to go up and down steps reciprocally with no pain. NOT MET- CONTINUE  3. I with HEP as prescribed to allow patient to walk x 30 minutes at grocery with no R knee pain. NOT MET- CONTINUE    REVISED GOALS  Short Term Goals:  Time Frame: deferred to [facility]'s    Long Term Goals:  Time Frame: 5 weeks  1. Increase AROM R hip flexion to 80, knee 0- 135 degrees to allow patient to report able to walk with normal gait pattern with no pain. 2. Increase strength R LE to 4/5 to allow patient to go up and down steps reciprocally with no pain. 3. I with HEP as prescribed to allow patient to walk x 30 minutes at grocery with no R knee pain. Patient Education:    [x]  HEP/Education Completed: FWB, step stance, tband standing hip 4way  []  No new Education completed  []  Reviewed Prior HEP      [x]  Patient verbalized and/or demonstrated understanding of education provided. []  Patient unable to verbalize and/or demonstrate understanding of education provided. Will continue education. [x]  Barriers to learning: none    PLAN:  Treatment Recommendations: Strengthening, Range of Motion, Balance Training, Gait Training, Stair Training, Home Exercise Program and Patient Education   Plan to see patient 2 times per week for 5 weeks to address the treatment planned outlined above.   [x]  Continue with current plan of care  []  Modify plan of care as follows:    []  Hold pending physician visit  []  Discharge    Time In 940   Time Out 1005   Timed Code Minutes: 25 min   Total Treatment Time: 25 min       Electronically Signed by: Annita Jacobs PT

## 2021-11-01 ENCOUNTER — HOSPITAL ENCOUNTER (OUTPATIENT)
Dept: PHYSICAL THERAPY | Age: 31
Setting detail: THERAPIES SERIES
Discharge: HOME | End: 2021-11-01
Payer: COMMERCIAL

## 2021-11-01 ENCOUNTER — HOSPITAL ENCOUNTER (OUTPATIENT)
Age: 31
Discharge: HOME | End: 2021-11-01
Payer: COMMERCIAL

## 2021-11-01 DIAGNOSIS — R63.5 WEIGHT GAIN: ICD-10-CM

## 2021-11-01 DIAGNOSIS — Z00.00 ANNUAL PHYSICAL EXAM: ICD-10-CM

## 2021-11-01 DIAGNOSIS — E88.81 INSULIN RESISTANCE: ICD-10-CM

## 2021-11-01 LAB
ALBUMIN SERPL-MCNC: 4.5 G/DL (ref 3.5–5.1)
ALP BLD-CCNC: 64 U/L (ref 38–126)
ALT SERPL-CCNC: 11 U/L (ref 11–66)
ANION GAP SERPL CALCULATED.3IONS-SCNC: 12 MEQ/L (ref 8–16)
AST SERPL-CCNC: 13 U/L (ref 5–40)
AVERAGE GLUCOSE: 96 MG/DL (ref 70–126)
BASOPHILS # BLD: 0.9 %
BASOPHILS ABSOLUTE: 0.1 THOU/MM3 (ref 0–0.1)
BILIRUB SERPL-MCNC: 0.5 MG/DL (ref 0.3–1.2)
BUN BLDV-MCNC: 10 MG/DL (ref 7–22)
CALCIUM SERPL-MCNC: 9.3 MG/DL (ref 8.5–10.5)
CHLORIDE BLD-SCNC: 103 MEQ/L (ref 98–111)
CO2: 25 MEQ/L (ref 23–33)
CREAT SERPL-MCNC: 0.9 MG/DL (ref 0.4–1.2)
EOSINOPHIL # BLD: 0.9 %
EOSINOPHILS ABSOLUTE: 0.1 THOU/MM3 (ref 0–0.4)
ERYTHROCYTE [DISTWIDTH] IN BLOOD BY AUTOMATED COUNT: 12.2 % (ref 11.5–14.5)
ERYTHROCYTE [DISTWIDTH] IN BLOOD BY AUTOMATED COUNT: 39.6 FL (ref 35–45)
GFR SERPL CREATININE-BSD FRML MDRD: 73 ML/MIN/1.73M2
GLUCOSE BLD-MCNC: 83 MG/DL (ref 70–108)
HBA1C MFR BLD: 5.2 % (ref 4.4–6.4)
HCT VFR BLD CALC: 48.4 % (ref 37–47)
HEMOGLOBIN: 15.7 GM/DL (ref 12–16)
IMMATURE GRANS (ABS): 0.02 THOU/MM3 (ref 0–0.07)
IMMATURE GRANULOCYTES: 0.3 %
LYMPHOCYTES # BLD: 26 %
LYMPHOCYTES ABSOLUTE: 1.8 THOU/MM3 (ref 1–4.8)
MCH RBC QN AUTO: 28.9 PG (ref 26–33)
MCHC RBC AUTO-ENTMCNC: 32.4 GM/DL (ref 32.2–35.5)
MCV RBC AUTO: 89 FL (ref 81–99)
MONOCYTES # BLD: 9.9 %
MONOCYTES ABSOLUTE: 0.7 THOU/MM3 (ref 0.4–1.3)
NUCLEATED RED BLOOD CELLS: 0 /100 WBC
PLATELET # BLD: 306 THOU/MM3 (ref 130–400)
PMV BLD AUTO: 9.7 FL (ref 9.4–12.4)
POTASSIUM SERPL-SCNC: 4.1 MEQ/L (ref 3.5–5.2)
RBC # BLD: 5.44 MILL/MM3 (ref 4.2–5.4)
SEG NEUTROPHILS: 62 %
SEGMENTED NEUTROPHILS ABSOLUTE COUNT: 4.3 THOU/MM3 (ref 1.8–7.7)
SODIUM BLD-SCNC: 140 MEQ/L (ref 135–145)
T4 FREE: 1.2 NG/DL (ref 0.93–1.76)
TOTAL PROTEIN: 7.2 G/DL (ref 6.1–8)
TSH SERPL DL<=0.05 MIU/L-ACNC: 3.39 UIU/ML (ref 0.4–4.2)
WBC # BLD: 7 THOU/MM3 (ref 4.8–10.8)

## 2021-11-01 PROCEDURE — 97110 THERAPEUTIC EXERCISES: CPT

## 2021-11-01 PROCEDURE — 83525 ASSAY OF INSULIN: CPT

## 2021-11-01 PROCEDURE — 80053 COMPREHEN METABOLIC PANEL: CPT

## 2021-11-01 PROCEDURE — 84439 ASSAY OF FREE THYROXINE: CPT

## 2021-11-01 PROCEDURE — 85025 COMPLETE CBC W/AUTO DIFF WBC: CPT

## 2021-11-01 PROCEDURE — 84443 ASSAY THYROID STIM HORMONE: CPT

## 2021-11-01 PROCEDURE — 83036 HEMOGLOBIN GLYCOSYLATED A1C: CPT

## 2021-11-01 PROCEDURE — 36415 COLL VENOUS BLD VENIPUNCTURE: CPT

## 2021-11-01 NOTE — PROGRESS NOTES
[address] [facility]  PHYSICAL THERAPY  [x] DAILY NOTE (LAND) [] DAILY NOTE (AQUATIC ) [] PROGRESS NOTE [] DISCHARGE NOTE    [] [address] [facility]   [x] [address]    [] [facility]   [] [facility]    Date: 2021  Patient Name:  Boyd Finch  : 1990  MRN: [id number]  CSN: [id number]    Referring Practitioner Colin Sanchez MD   Diagnosis Sprain of anterior cruciate ligament of right knee, subsequent encounter, Other tear of medial meniscus, current injury, right knee, subsequent encounter, Tear of articular cartilage of right knee, current, subsequent encounter   Treatment Diagnosis S83.241A, S83.511A, S83.31XA   Date of Evaluation 21   Additional Pertinent History R ACL reconstruction  Dr. Ariane Renner. 2nd ACL reconstruction and meniscus repair on 21 by Dr. Tomasz Brown.       Functional Outcome Measure Used LEFS    Functional Outcome Score eval score 3 (21)       Insurance: Primary: Payor: Bernard Mendoza /  /  / ,   Secondary:    Authorization Information: 12 visits, pays at 100%, - 10/01 + 24 more visits from 9/15-11/10/21= 36 visits   Visit # 23, 10 for progress note + start 2nd new C9 9/15/21-11/10/21 for 24 more visits   Visits Allowed: 36   Recertification Date: 57   Physician Follow-Up: 2021   Physician Orders: No RTW until after 21 f/u with MD   History of Present Illness:      SUBJECTIVE: patient reports walking with cane, 10 minutes late so several exercises held     OBJECTIVE:  TREATMENT   Precautions: PWB 50% with crutches 4 weeks from - 10/20, FWB  Without crutches on 10/21   Pain: 2/10 R knee walking with 1 crutch, 4/10 walking without crutch with moderate antalgia    X in shaded column indicates activity completed today   Modalities Parameters/  Location  Notes                     Manual Therapy Time/Technique  Notes                     Exercise/Intervention   Notes   Bike seat 2 5 walk with normal gait pattern with no pain. MET FOR AROM SO WILL UPDATE  2. Increase strength R LE to 4/5 to allow patient to go up and down steps reciprocally with no pain. NOT MET- CONTINUE  3. I with HEP as prescribed to allow patient to walk x 30 minutes at grocery with no R knee pain. NOT MET- CONTINUE    REVISED GOALS  Short Term Goals:  Time Frame: deferred to [facility]'s    Long Term Goals:  Time Frame: 5 weeks  1. Increase AROM R hip flexion to 80, knee 0- 135 degrees to allow patient to report able to walk with normal gait pattern with no pain. 2. Increase strength R LE to 4/5 to allow patient to go up and down steps reciprocally with no pain. 3. I with HEP as prescribed to allow patient to walk x 30 minutes at grocery with no R knee pain. Patient Education:    [x]  HEP/Education Completed: all HEP 2 x per day  []  No new Education completed  []  Reviewed Prior HEP      [x]  Patient verbalized and/or demonstrated understanding of education provided. []  Patient unable to verbalize and/or demonstrate understanding of education provided. Will continue education. [x]  Barriers to learning: none    PLAN:  Treatment Recommendations: Strengthening, Range of Motion, Balance Training, Gait Training, Stair Training, Home Exercise Program and Patient Education   Plan to see patient 2 times per week for 5 weeks to address the treatment planned outlined above.   [x]  Continue with current plan of care  []  Modify plan of care as follows:    []  Hold pending physician visit  []  Discharge    Time In 940   Time Out 1005   Timed Code Minutes: 25 min   Total Treatment Time: 25 min       Electronically Signed by: Roland Barger PT

## 2021-11-02 LAB — INSULIN, RANDOM OR FASTING: 27.1 MU/L

## 2021-11-03 ENCOUNTER — TELEPHONE (OUTPATIENT)
Dept: FAMILY MEDICINE CLINIC | Age: 31
End: 2021-11-03

## 2021-11-03 ENCOUNTER — HOSPITAL ENCOUNTER (OUTPATIENT)
Dept: PHYSICAL THERAPY | Age: 31
Setting detail: THERAPIES SERIES
Discharge: HOME | End: 2021-11-03
Payer: COMMERCIAL

## 2021-11-03 DIAGNOSIS — E88.81 INSULIN RESISTANCE: ICD-10-CM

## 2021-11-03 DIAGNOSIS — E16.1 INCREASED INSULIN LEVEL: Primary | ICD-10-CM

## 2021-11-03 PROCEDURE — 97110 THERAPEUTIC EXERCISES: CPT

## 2021-11-03 NOTE — TELEPHONE ENCOUNTER
----- Message from Maria Teresa Vincent MD sent at 11/3/2021 11:53 AM EDT -----  Notify her that her labs look ok except for slightly elevated fasting insulin level c/w mild insulin resistance. See if she would like to start metformin to help. If so, ok for metformin 500mg po bid #60/5. Recheck fasting insulin level in 3 months.  CG

## 2021-11-03 NOTE — PROGRESS NOTES
[address] [facility]  PHYSICAL THERAPY  [x] DAILY NOTE (LAND) [] DAILY NOTE (AQUATIC ) [] PROGRESS NOTE [] DISCHARGE NOTE    [] [address] [facility]   [x] [address]    [] [facility]   [] Amanda Choudhary    Date: 11/3/2021  Patient Name:  Karissa Coronel  : 1990  MRN: [id number]  CSN: [id number]    Referring Practitioner Diane Chapin MD   Diagnosis Sprain of anterior cruciate ligament of right knee, subsequent encounter, Other tear of medial meniscus, current injury, right knee, subsequent encounter, Tear of articular cartilage of right knee, current, subsequent encounter   Treatment Diagnosis S83.241A, S83.511A, S83.31XA   Date of Evaluation 21   Additional Pertinent History R ACL reconstruction  Dr. Chelsea Lira. 2nd ACL reconstruction and meniscus repair on 21 by Dr. Herberth Flores. Functional Outcome Measure Used LEFS    Functional Outcome Score eval score 3 (21)       Insurance: Primary: Payor: Cindy Aguilar /  /  / ,   Secondary:    Authorization Information: 12 visits, pays at 100%, - 10/01 + 24 more visits from 9/15-11/10/21= 36 visits   Visit # 24, 2/10 for progress note + start 2nd new C9 9/15/21-11/10/21 for 24 more visits   Visits Allowed: 36   Recertification Date: 41   Physician Follow-Up: 2021   Physician Orders: No RTW until after 21 f/u with MD   History of Present Illness:      SUBJECTIVE: patient reports doctor visit went well, no RTW until possibly . Pt reports increased pain today d/t being more active the last couple of days. Pt ambulating with cane this date with moderate reliance on AD.     OBJECTIVE:  TREATMENT   Precautions: PWB 50% with crutches 4 weeks from - 10/20, FWB  Without crutches on 10/21   Pain: 6-7/10 R knee walking with SPC moderate antalgia    X in shaded column indicates activity completed today   Modalities Parameters/  Location  Notes Manual Therapy Time/Technique  Notes                     Exercise/Intervention   Notes   Bike seat 2 5 minutes full rev  x    Heel slide with strap 30      Quad set 25 5 sec  AROM R knee 0- 130 degrees   DF belt stretch 5 15 sec     Sitting EOB knee flexion 25 5     SLR 4 way 20 5     HS stretch at step  3x 20 sec  x    Total gym squat 20-25 squats  X   Level 8   Standing hip 4 way with tband 2x5  x B   Heel raises  20  x    Weight shifts S/S, step stance B  1 min ea  Increased pain with step stance   Standing TKE  15 5 x With orange tband   Step up 4 inch step forward 5  x Completed L leg stepping up on step, increased hop, moved to two inch today. Specific Interventions Next Treatment: NWB R ACL protocol without meniscus repair , No restrictions with AROM, brace locked in 0 degrees at all times except with PT and HEP    Activity/Treatment Tolerance:  []  Patient tolerated treatment well  []  Patient limited by fatigue  [x]  Patient limited by pain   []  Patient limited by medical complications  []  Other:     Assessment: Pt continues with mod to high pain levels with WB exercises. Increased pain noted when standing on R LE SLS for L hip 3way. Pain 7/10 at end of session. GOALS:  Patient Goal: to get my knee back to normal    REVISED GOALS  Short Term Goals:  Time Frame: 4 weeks  1. Increase AROM R hip flexion to 70, knee 0- 120 degrees to allow patient to report able to walk in house without crutches as OK per MD.  MET R HIP FLEXION 80, KNEE 0- 130 DEGREES, WALKING IN BEDROOM WITHOUT CRUTCH BUT PAIN 4-5/10 WALKING WITHOUT CRUTCH, 2/10 WITH 1 CRUTCH  2. Increase strength R LE to 4-/5 to allow patient to return to driving when OK per MD.  MET R LE 4-/5, DRIVING WITHOUT INCREASED PAINt  3. I with HEP as prescribed to allow patient to report able to sleep x 6 hours without awakening due to R knee pain.   MET I WITH HEP BUT ONLY DOING 1 X PER DAY, SLEEPING THROUGH NIGHT WITHOUT AWAKENING DUE TO PAIN    Long Term Goals:  Time Frame: 12 weeks  1. Increase AROM R hip flexion to 60, knee 0- 125 degrees to allow patient to report able to walk with normal gait pattern with no pain. MET FOR AROM SO WILL UPDATE  2. Increase strength R LE to 4/5 to allow patient to go up and down steps reciprocally with no pain. NOT MET- CONTINUE  3. I with HEP as prescribed to allow patient to walk x 30 minutes at grocery with no R knee pain. NOT MET- CONTINUE    REVISED GOALS  Short Term Goals:  Time Frame: deferred to LT's    Long Term Goals:  Time Frame: 5 weeks  1. Increase AROM R hip flexion to 80, knee 0- 135 degrees to allow patient to report able to walk with normal gait pattern with no pain. 2. Increase strength R LE to 4/5 to allow patient to go up and down steps reciprocally with no pain. 3. I with HEP as prescribed to allow patient to walk x 30 minutes at grocery with no R knee pain. Patient Education:    [x]  HEP/Education Completed: all HEP 2 x per day  []  No new Education completed  [x]  Reviewed Prior HEP      [x]  Patient verbalized and/or demonstrated understanding of education provided. []  Patient unable to verbalize and/or demonstrate understanding of education provided. Will continue education. [x]  Barriers to learning: none    PLAN:  Treatment Recommendations: Strengthening, Range of Motion, Balance Training, Gait Training, Stair Training, Home Exercise Program and Patient Education   Plan to see patient 2 times per week for 5 weeks to address the treatment planned outlined above.   [x]  Continue with current plan of care  []  Modify plan of care as follows:    []  Hold pending physician visit  []  Discharge    Time In 1145   Time Out 1215   Timed Code Minutes: 30 min   Total Treatment Time: 30 min       Electronically Signed by: Tanvi Castro, PTA [id number]

## 2021-11-08 ENCOUNTER — HOSPITAL ENCOUNTER (OUTPATIENT)
Dept: PHYSICAL THERAPY | Age: 31
Setting detail: THERAPIES SERIES
Discharge: HOME | End: 2021-11-08
Payer: COMMERCIAL

## 2021-11-08 PROCEDURE — 97110 THERAPEUTIC EXERCISES: CPT

## 2021-11-08 NOTE — PROGRESS NOTES
[address] [facility]  PHYSICAL THERAPY  [x] DAILY NOTE (LAND) [] DAILY NOTE (AQUATIC ) [] PROGRESS NOTE [] DISCHARGE NOTE    [] [address] [facility]   [x] [address]    [] [facility]   [] Maynor Boyce    Date: 2021  Patient Name:  Pankaj Cummins  : 1990  MRN: [id number]  CSN: [id number]    Referring Practitioner Marva Moeller MD   Diagnosis Sprain of anterior cruciate ligament of right knee, subsequent encounter, Other tear of medial meniscus, current injury, right knee, subsequent encounter, Tear of articular cartilage of right knee, current, subsequent encounter   Treatment Diagnosis S83.241A, S83.511A, S83.31XA   Date of Evaluation 21   Additional Pertinent History R ACL reconstruction  Dr. Syble Essex. 2nd ACL reconstruction and meniscus repair on 21 by Dr. Alf Sanchez.       Functional Outcome Measure Used LEFS    Functional Outcome Score eval score 3 (21)       Insurance: Primary: Payor: Ángel Cristobal /  /  / ,   Secondary:    Authorization Information: 12 visits, pays at 100%, - 10/01 + 24 more visits from 9/15-11/10/21= 36 visits   Visit # 25, 3/10 for progress note + start 2nd new C9 9/15/21-11/10/21 for 24 more visits   Visits Allowed: 36   Recertification Date:    Physician Follow-Up: Dec 13, 2021   Physician Orders: No RTW until after 21 f/u with MD   History of Present Illness:      SUBJECTIVE: patient 10 minutes late for session, frequently late and PT recommended leave earlier to get to PT in time, patient states \"I can't time out my bowel movements\"  OBJECTIVE:  TREATMENT   Precautions: PWB 50% with crutches 4 weeks from - 10/20, FWB  Without crutches on 10/21   Pain: 310 R knee walking     X in shaded column indicates activity completed today   Modalities Parameters/  Location  Notes                     Manual Therapy Time/Technique  Notes                     Exercise/Intervention Notes   Bike seat 2 5 minutes full rev  x    Heel slide with strap 15  x    Quad set 15 5 sec x AROM R knee 0- 130 degrees   DF belt stretch 5 15 sec     SLR flexion 15 5 x    SLR 4 way 20 5     HS stretch at step  3x 20 sec      Total gym squat 20 squats  X   Level 8   rockerboard forward/back, side/side 10  x    Heel raises B 20  x    Nautilus leg press 20# 1 x 15  x    Standing TKE  15 5  With orange tband   Step up 2 inch step forward 10  x      Specific Interventions Next Treatment: NWB R ACL protocol without meniscus repair , No restrictions with AROM, brace locked in 0 degrees at all times except with PT and HEP    Activity/Treatment Tolerance:  []  Patient tolerated treatment well  []  Patient limited by fatigue  [x]  Patient limited by pain   []  Patient limited by medical complications  []  Other:     Assessment: PT able to get 25 minutes in today but 10 minutes late. Note several exercises not done due to late but did progress to RB, leg press  GOALS:  Patient Goal: to get my knee back to normal    REVISED GOALS  Short Term Goals:  Time Frame: deferred to LTG's    Long Term Goals:  Time Frame: 5 weeks  1. Increase AROM R hip flexion to 80, knee 0- 135 degrees to allow patient to report able to walk with normal gait pattern with no pain. 2. Increase strength R LE to 4/5 to allow patient to go up and down steps reciprocally with no pain. 3. I with HEP as prescribed to allow patient to walk x 30 minutes at grocery with no R knee pain. Patient Education:    [x]  HEP/Education Completed: all HEP 2 x per day  []  No new Education completed  [x]  Reviewed Prior HEP      [x]  Patient verbalized and/or demonstrated understanding of education provided. []  Patient unable to verbalize and/or demonstrate understanding of education provided. Will continue education.   [x]  Barriers to learning: none    PLAN:  Treatment Recommendations: Strengthening, Range of Motion, Balance Training, Gait Training, Stair Training, Home Exercise Program and Patient Education   Plan to see patient 2 times per week for 5 weeks to address the treatment planned outlined above.   [x]  Continue with current plan of care  []  Modify plan of care as follows:    []  Hold pending physician visit  []  Discharge    Time In 1140   Time Out 1205   Timed Code Minutes: 25 min   Total Treatment Time: 25 min       Electronically Signed by: Schuyler Alonzo PT

## 2021-11-10 ENCOUNTER — HOSPITAL ENCOUNTER (OUTPATIENT)
Dept: PHYSICAL THERAPY | Age: 31
Setting detail: THERAPIES SERIES
Discharge: HOME | End: 2021-11-10
Payer: COMMERCIAL

## 2021-11-10 PROCEDURE — 97110 THERAPEUTIC EXERCISES: CPT

## 2021-11-10 NOTE — PROGRESS NOTES
rev  x    Heel slide with strap 15  x    Quad set 15 5 sec x AROM R knee 0- 130 degrees   DF belt stretch 5 15 sec     SLR flexion 15 5 x    SLR 4 way 20 5     HS stretch at step  3x 20 sec  x    Total gym squat 25 squats  X   Level 8   rockerboard forward/back, side/side 10  x Increase reps next visit   Heel raises B 25  x    Nautilus leg press 20# 1 x 15  x    Standing TKE  15 5  With orange tband   Step up 2 inch step forward 15  x      Specific Interventions Next Treatment: NWB R ACL protocol without meniscus repair , No restrictions with AROM, brace locked in 0 degrees at all times except with PT and HEP    Activity/Treatment Tolerance:  []  Patient tolerated treatment well  []  Patient limited by fatigue  [x]  Patient limited by pain   []  Patient limited by medical complications  []  Other:     Assessment: Pt tolerated session well, improved tolerance with standing exs this date. Plan to progress with standing exs as tolerable next visit. GOALS:  Patient Goal: to get my knee back to normal    REVISED GOALS  Short Term Goals:  Time Frame: deferred to LTG's    Long Term Goals:  Time Frame: 5 weeks  1. Increase AROM R hip flexion to 80, knee 0- 135 degrees to allow patient to report able to walk with normal gait pattern with no pain. 2. Increase strength R LE to 4/5 to allow patient to go up and down steps reciprocally with no pain. 3. I with HEP as prescribed to allow patient to walk x 30 minutes at grocery with no R knee pain. Patient Education:    [x]  HEP/Education Completed: all HEP 2 x per day  []  No new Education completed  [x]  Reviewed Prior HEP      [x]  Patient verbalized and/or demonstrated understanding of education provided. []  Patient unable to verbalize and/or demonstrate understanding of education provided. Will continue education.   [x]  Barriers to learning: none    PLAN:  Treatment Recommendations: Strengthening, Range of Motion, Balance Training, Gait Training, Stair Training, Home Exercise Program and Patient Education   Plan to see patient 2 times per week for 5 weeks to address the treatment planned outlined above.   [x]  Continue with current plan of care  []  Modify plan of care as follows:    []  Hold pending physician visit  []  Discharge    Time In 1135   Time Out 1200   Timed Code Minutes: 25 min   Total Treatment Time: 25 min       Electronically Signed by: Nayana Crawford, PTA [id number]

## 2021-11-16 ENCOUNTER — HOSPITAL ENCOUNTER (OUTPATIENT)
Dept: PHYSICAL THERAPY | Age: 31
Setting detail: THERAPIES SERIES
End: 2021-11-16
Payer: COMMERCIAL

## 2021-11-17 ENCOUNTER — HOSPITAL ENCOUNTER (OUTPATIENT)
Dept: PHYSICAL THERAPY | Age: 31
Setting detail: THERAPIES SERIES
Discharge: HOME | End: 2021-11-17
Payer: COMMERCIAL

## 2021-11-17 PROCEDURE — 97110 THERAPEUTIC EXERCISES: CPT

## 2021-11-17 NOTE — PROGRESS NOTES
[address] [facility]  PHYSICAL THERAPY  [x] DAILY NOTE (LAND) [] DAILY NOTE (AQUATIC ) [] PROGRESS NOTE [] DISCHARGE NOTE    [] [address] [facility]   [x] [address]    [] [facility]   [] Carol Bruner    Date: 2021  Patient Name:  Trevor Holbrook  : 1990  MRN: [id number]  CSN: [id number]    Referring Practitioner Maynor Zuniga MD   Diagnosis Sprain of anterior cruciate ligament of right knee, subsequent encounter, Other tear of medial meniscus, current injury, right knee, subsequent encounter, Tear of articular cartilage of right knee, current, subsequent encounter   Treatment Diagnosis S83.241A, S83.511A, S83.31XA   Date of Evaluation 21   Additional Pertinent History R ACL reconstruction  Dr. Shahab Terry. 2nd ACL reconstruction and meniscus repair on 21 by Dr. Juan A Bailey. Functional Outcome Measure Used LEFS    Functional Outcome Score eval score 3 (21)       Insurance: Primary: Payor: Sarah Fuller /  /  / ,   Secondary:    Authorization Information: 12 visits, pays at 100%, - 10/01 + 24 more visits from 9/15-11/10/21= 36 visits   Visit # 27, 5/10 for progress note + start 2nd new C9 9/15/21-21 for 24 more visits   Visits Allowed: 36   Recertification Date:    Physician Follow-Up: Dec 13, 2021   Physician Orders: No RTW until after 21 f/u with MD   History of Present Illness:      SUBJECTIVE: patient reports min soreness today 1/10, states very tired today d/t being out last night late. Reports the \"popping\" has decreased in severity- knee still pops but it is not as painful as it has been in the past.  Reports she has started to not use the cane during short distances and feels stronger.     OBJECTIVE:  TREATMENT   Precautions: PWB 50% with crutches 4 weeks from - 10/20, FWB  Without crutches on 10/21   Pain: 1/10 R knee walking     X in shaded column indicates activity completed today Modalities Parameters/  Location  Notes                     Manual Therapy Time/Technique  Notes                     Exercise/Intervention   Notes   Bike seat 2 5 minutes full rev  x    Heel slide with strap 15      Quad set 15 5 sec  AROM R knee 0- 130 degrees   DF belt stretch 5 15 sec     SLR flexion 15 5     SLR 4 way 20 5     HS stretch at step  3x 20 sec  x Added calf stretch and knee flex stretch   Total gym squat 30 squats  X   Level 8   rockerboard forward/back, side/side 15  x    Heel raises B 25  x    Nautilus leg press 20# 2 x 15  x    Standing TKE  15 5  With orange tband   Step up 2 inch step forward and lat with R 15  x      Specific Interventions Next Treatment: NWB R ACL protocol without meniscus repair , No restrictions with AROM, brace locked in 0 degrees at all times except with PT and HEP    Activity/Treatment Tolerance:  []  Patient tolerated treatment well  []  Patient limited by fatigue  [x]  Patient limited by pain   []  Patient limited by medical complications  []  Other:     Assessment: Pt progressed with exs this date with decreased discomfort noted. Improved WB on R LE noted and improved mm strength. GOALS:  Patient Goal: to get my knee back to normal    REVISED GOALS  Short Term Goals:  Time Frame: deferred to LTG's    Long Term Goals:  Time Frame: 5 weeks  1. Increase AROM R hip flexion to 80, knee 0- 135 degrees to allow patient to report able to walk with normal gait pattern with no pain. 2. Increase strength R LE to 4/5 to allow patient to go up and down steps reciprocally with no pain. 3. I with HEP as prescribed to allow patient to walk x 30 minutes at grocery with no R knee pain. Patient Education:    [x]  HEP/Education Completed: all HEP 2 x per day  []  No new Education completed  [x]  Reviewed Prior HEP      [x]  Patient verbalized and/or demonstrated understanding of education provided.   []  Patient unable to verbalize and/or demonstrate understanding of education provided. Will continue education. [x]  Barriers to learning: none    PLAN:  Treatment Recommendations: Strengthening, Range of Motion, Balance Training, Gait Training, Stair Training, Home Exercise Program and Patient Education   Plan to see patient 2 times per week for 5 weeks to address the treatment planned outlined above.   [x]  Continue with current plan of care  []  Modify plan of care as follows:    []  Hold pending physician visit  []  Discharge    Time In 1003   Time Out 1030   Timed Code Minutes: 27 min   Total Treatment Time: 27 min       Electronically Signed by: Benita Gomez, PTA [id number]

## 2021-11-18 ENCOUNTER — HOSPITAL ENCOUNTER (OUTPATIENT)
Dept: PHYSICAL THERAPY | Age: 31
Setting detail: THERAPIES SERIES
Discharge: HOME | End: 2021-11-18
Payer: COMMERCIAL

## 2021-11-18 PROCEDURE — 97110 THERAPEUTIC EXERCISES: CPT

## 2021-11-18 NOTE — PROGRESS NOTES
[address] [facility]  PHYSICAL THERAPY  [x] DAILY NOTE (LAND) [] DAILY NOTE (AQUATIC ) [] PROGRESS NOTE [] DISCHARGE NOTE    [] [address] [facility]   [x] [address]    [] [facility]   [] Mike Davis    Date: 2021  Patient Name:  Rich Stephens  : 1990  MRN: [id number]  CSN: [id number]    Referring Practitioner Nathan Mcfarlane MD   Diagnosis Sprain of anterior cruciate ligament of right knee, subsequent encounter, Other tear of medial meniscus, current injury, right knee, subsequent encounter, Tear of articular cartilage of right knee, current, subsequent encounter   Treatment Diagnosis S83.241A, S83.511A, S83.31XA   Date of Evaluation 21   Additional Pertinent History R ACL reconstruction  Dr. Neil Law. 2nd ACL reconstruction and meniscus repair on 21 by Dr. Brianna Chance. Functional Outcome Measure Used LEFS    Functional Outcome Score eval score 3 (21)       Insurance: Primary: Payor: Pita Duncan /  /  / ,   Secondary:    Authorization Information: 12 visits, pays at 100%, - 10/01 + 24 more visits from 9/15-11/10/21= 36 visits   Visit # 28, 10 for progress note + start 2nd new C9 9/15/21-21 for 24 more visits   Visits Allowed: 36   Recertification Date:    Physician Follow-Up: Dec 13, 2021   Physician Orders: No RTW until after 21 f/u with MD   History of Present Illness:      SUBJECTIVE: patient reporting that she didn't sleep well last night. Patient reporting knee sore today but did not report a pain level.     OBJECTIVE:  TREATMENT   Precautions: PWB 50% with crutches 4 weeks from - 10/20, FWB  Without crutches on 10/21   Pain: 10 R knee walking     X in shaded column indicates activity completed today   Modalities Parameters/  Location  Notes                     Manual Therapy Time/Technique  Notes                     Exercise/Intervention   Notes   Bike seat 2 5 minutes full Motion, Balance Training, Gait Training, Stair Training, Home Exercise Program and Patient Education   Plan to see patient 2 times per week for 5 weeks to address the treatment planned outlined above.   [x]  Continue with current plan of care  []  Modify plan of care as follows:    []  Hold pending physician visit  []  Discharge    Time In 1041   Time Out 1105   Timed Code Minutes: 24 min   Total Treatment Time: 24 min       Electronically Signed by: Makenna Smith PTA

## 2021-11-22 ENCOUNTER — HOSPITAL ENCOUNTER (OUTPATIENT)
Dept: PHYSICAL THERAPY | Age: 31
Setting detail: THERAPIES SERIES
Discharge: HOME | End: 2021-11-22
Payer: COMMERCIAL

## 2021-11-22 PROCEDURE — 97110 THERAPEUTIC EXERCISES: CPT

## 2021-11-22 NOTE — PROGRESS NOTES
rev  x    Heel slide with strap 15      Quad set 15 5 sec  AROM R knee 0- 130 degrees   DF belt stretch 5 15 sec     SLR flexion 15 5     SLR 4 way 20 5     HS, calf and knee flex stretch at step  3x 20 sec  x    Total gym squat  Heel raises 30   15  X  x   Level 8   rockerboard forward/back, side/side 20  x    Heel raises B 25  x    Nautilus leg press 20# 2 x 15  x    3 way hip on foam  10  x B   Standing TKE  15 5  With orange tband   Step up 2 inch step forward and lat with R 15  x      Specific Interventions Next Treatment: NWB R ACL protocol without meniscus repair, No restrictions with AROM, brace locked in 0 degrees at all times except with PT and HEP    Activity/Treatment Tolerance:  []  Patient tolerated treatment well  []  Patient limited by fatigue  [x]  Patient limited by pain   []  Patient limited by medical complications  []  Other:     Assessment: Pt tolerated session fairly well, increased fatigue noted with 3way hip ex on foam, verbal cueing required for upright posture and proper technique. GOALS:  Patient Goal: to get my knee back to normal    REVISED GOALS  Short Term Goals:  Time Frame: deferred to LTG's    Long Term Goals:  Time Frame: 5 weeks  1. Increase AROM R hip flexion to 80, knee 0- 135 degrees to allow patient to report able to walk with normal gait pattern with no pain. 2. Increase strength R LE to 4/5 to allow patient to go up and down steps reciprocally with no pain. 3. I with HEP as prescribed to allow patient to walk x 30 minutes at grocery with no R knee pain. Patient Education:    []  HEP/Education Completed:3 way hip  []  No new Education completed  [x]  Reviewed Prior HEP      [x]  Patient verbalized and/or demonstrated understanding of education provided. []  Patient unable to verbalize and/or demonstrate understanding of education provided. Will continue education.   [x]  Barriers to learning: none    PLAN:  Treatment Recommendations: Strengthening, Range of

## 2021-11-29 ENCOUNTER — HOSPITAL ENCOUNTER (OUTPATIENT)
Dept: PHYSICAL THERAPY | Age: 31
Setting detail: THERAPIES SERIES
Discharge: HOME | End: 2021-11-29
Payer: COMMERCIAL

## 2021-11-29 PROCEDURE — 97110 THERAPEUTIC EXERCISES: CPT

## 2021-11-29 NOTE — PROGRESS NOTES
[address] [facility]  PHYSICAL THERAPY  [x] DAILY NOTE (LAND) [] DAILY NOTE (AQUATIC ) [] PROGRESS NOTE [] DISCHARGE NOTE    [] [address] [facility]   [x] [address]    [] [facility]   [] Chris Shearing    Date: 2021  Patient Name:  Maria Elena Fitch  : 1990  MRN: [id number]  CSN: [id number]    Referring Practitioner Agueda Clemente MD   Diagnosis Sprain of anterior cruciate ligament of right knee, subsequent encounter, Other tear of medial meniscus, current injury, right knee, subsequent encounter, Tear of articular cartilage of right knee, current, subsequent encounter   Treatment Diagnosis S83.241A, S83.511A, S83.31XA   Date of Evaluation 21   Additional Pertinent History R ACL reconstruction  Dr. Terrance Gaucher. 2nd ACL reconstruction and meniscus repair on 21 by Dr. Adore Levin. Functional Outcome Measure Used LEFS    Functional Outcome Score eval score 3 (21)       Insurance: Primary: Payor: Karolina Gibbons /  /  / ,   Secondary:    Authorization Information: 12 visits, pays at 100%, - 10/01 + 24 more visits from 9/15-11/10/21= 36 visits   Visit # 30, 8/10 for progress note + start 2nd new C9 9/15/21-21 for 24 more visits   Visits Allowed: 36   Recertification Date:    Physician Follow-Up: Dec 13, 2021   Physician Orders: No RTW until after 21 f/u with MD   History of Present Illness:      SUBJECTIVE: patient reports pain is minimal today. States she still uses the buggy at the grocery store because she gets tired when pushing the cart and walking. Pt reports has been doing more walking without her cane at home but feels like she still has a noticeable limp. Pt reports she is going to have to go back to work mid December to have health insurance and is worried about her tasks while at work.     OBJECTIVE:  TREATMENT   Precautions: PWB 50% with crutches 4 weeks from - 10/20, FWB  Without crutches on 10/21   Pain: 1/10 R knee walking     X in shaded column indicates activity completed today   Modalities Parameters/  Location  Notes                     Manual Therapy Time/Technique  Notes                     Exercise/Intervention   Notes   Bike seat 2 5 minutes full rev  x    Heel slide with strap 15      Quad set 15 5 sec  AROM R knee 0- 130 degrees   DF belt stretch 5 15 sec     SLR flexion 15 5     SLR 4 way 20 5     HS, calf and knee flex stretch at step  3x 20 sec  x    Total gym squat  Heel raises 35  20  X  x   Level 8   rockerboard forward/back, side/side 25  x    Heel raises B 25  x    Nautilus leg press 30# 2 x 15  x    3 way hip on foam  10  x B   Standing TKE  15 5  With orange tband   Step up 4 inch step forward and lat with R 10  x Able to complete 4 inch much better today than when tried in past     Specific Interventions Next Treatment: NWB R ACL protocol without meniscus repair, No restrictions with AROM, brace locked in 0 degrees at all times except with PT and HEP    Activity/Treatment Tolerance:  [x]  Patient tolerated treatment well  []  Patient limited by fatigue  []  Patient limited by pain   []  Patient limited by medical complications  []  Other:     Assessment: Pt progressed with exs today with min to no increased pain. Demo'd improved eccentric mm control with 4 inch step-ups, able to increase weight with leg press and increase reps with multiple exs. GOALS:  Patient Goal: to get my knee back to normal    REVISED GOALS  Short Term Goals:  Time Frame: deferred to LTG's    Long Term Goals:  Time Frame: 5 weeks  1. Increase AROM R hip flexion to 80, knee 0- 135 degrees to allow patient to report able to walk with normal gait pattern with no pain. 2. Increase strength R LE to 4/5 to allow patient to go up and down steps reciprocally with no pain. 3. I with HEP as prescribed to allow patient to walk x 30 minutes at grocery with no R knee pain.      Patient Education:    [] HEP/Education Completed:3 way hip  []  No new Education completed  [x]  Reviewed Prior HEP      [x]  Patient verbalized and/or demonstrated understanding of education provided. []  Patient unable to verbalize and/or demonstrate understanding of education provided. Will continue education. [x]  Barriers to learning: none    PLAN:  Treatment Recommendations: Strengthening, Range of Motion, Balance Training, Gait Training, Stair Training, Home Exercise Program and Patient Education   Plan to see patient 2 times per week for 5 weeks to address the treatment planned outlined above.   [x]  Continue with current plan of care  []  Modify plan of care as follows:    []  Hold pending physician visit  []  Discharge    Time In 1135   Time Out 1202   Timed Code Minutes: 27 min   Total Treatment Time:  27 min       Electronically Signed by: Galina Elder, PTA [id number]

## 2021-12-01 ENCOUNTER — HOSPITAL ENCOUNTER (OUTPATIENT)
Dept: PHYSICAL THERAPY | Age: 31
Setting detail: THERAPIES SERIES
Discharge: HOME | End: 2021-12-01
Payer: COMMERCIAL

## 2021-12-01 PROCEDURE — 97110 THERAPEUTIC EXERCISES: CPT

## 2021-12-01 NOTE — PROGRESS NOTES
[address] [facility]  PHYSICAL THERAPY  [x] DAILY NOTE (LAND) [] DAILY NOTE (AQUATIC ) [] PROGRESS NOTE [] DISCHARGE NOTE    [] 615 [facility]   [x] [address]    [] [facility]   [] Selena Citizen    Date: 2021  Patient Name:  Eleazar Silva  : 1990  MRN: [id number]  CSN: [id number]    Referring Practitioner Alley Hernandez MD   Diagnosis Sprain of anterior cruciate ligament of right knee, subsequent encounter, Other tear of medial meniscus, current injury, right knee, subsequent encounter, Tear of articular cartilage of right knee, current, subsequent encounter   Treatment Diagnosis S83.241A, S83.511A, S83.31XA   Date of Evaluation 21   Additional Pertinent History R ACL reconstruction  Dr. Vicente Last. 2nd ACL reconstruction and meniscus repair on 21 by Dr. Evangelina Murillo. Functional Outcome Measure Used LEFS    Functional Outcome Score eval score 3 (21)       Insurance: Primary: Payor: Laretta Cockayne /  /  / ,   Secondary:    Authorization Information: 12 visits, pays at 100%, - 10/01 + 24 more visits from 9/15-11/10/21= 36 visits   Visit # 31, 10 for progress note + start 2nd new C9 9/15/21-21 for 24 more visits   Visits Allowed: 36   Recertification Date:    Physician Follow-Up: Dec 13, 2021   Physician Orders: No RTW until after 21 f/u with MD   History of Present Illness:      SUBJECTIVE: patient states not being able to sleep last night, worried about going back to work.     OBJECTIVE:  TREATMENT   Precautions: PWB 50% with crutches 4 weeks from - 10/20, FWB  Without crutches on 10/21   Pain: 1/10 R knee walking     X in shaded column indicates activity completed today   Modalities Parameters/  Location  Notes                     Manual Therapy Time/Technique  Notes                     Exercise/Intervention   Notes   Bike seat 2 5 minutes full rev  x    Heel slide with strap 15 Quad set 15 5 sec  AROM R knee 0- 130 degrees   DF belt stretch 5 15 sec     SLR flexion 15 5     SLR 4 way 20 5     HS, calf and knee flex stretch at step  3x 20 sec  x    Total gym squat  Heel raises 35  20  X  x   Level 8   rockerboard forward/back, side/side 30  x    Heel raises B on foam 15  x    Nautilus leg press 30# 2 x 15  x    3 way hip on foam  15  x B   Standing TKE  15 5  With orange tband   Step up 4 inch step forward and lat with R 10  x      Specific Interventions Next Treatment: NWB R ACL protocol without meniscus repair, No restrictions with AROM, brace locked in 0 degrees at all times except with PT and HEP    Activity/Treatment Tolerance:  [x]  Patient tolerated treatment well  []  Patient limited by fatigue  []  Patient limited by pain   []  Patient limited by medical complications  []  Other:     Assessment: Pain 2-3/10 after exs today. Pt emotional throughout session about going back to work. Completed POC as tolerated, increased reps by 5 today. GOALS:  Patient Goal: to get my knee back to normal    REVISED GOALS  Short Term Goals:  Time Frame: deferred to LTG's    Long Term Goals:  Time Frame: 5 weeks  1. Increase AROM R hip flexion to 80, knee 0- 135 degrees to allow patient to report able to walk with normal gait pattern with no pain. 2. Increase strength R LE to 4/5 to allow patient to go up and down steps reciprocally with no pain. 3. I with HEP as prescribed to allow patient to walk x 30 minutes at grocery with no R knee pain. Patient Education:    []  HEP/Education Completed:3 way hip  []  No new Education completed  [x]  Reviewed Prior HEP      [x]  Patient verbalized and/or demonstrated understanding of education provided. []  Patient unable to verbalize and/or demonstrate understanding of education provided. Will continue education.   [x]  Barriers to learning: none    PLAN:  Treatment Recommendations: Strengthening, Range of Motion, Balance Training, Gait Training, Stair Training, Home Exercise Program and Patient Education   Plan to see patient 2 times per week for 5 weeks to address the treatment planned outlined above.   [x]  Continue with current plan of care  []  Modify plan of care as follows:    []  Hold pending physician visit  []  Discharge    Time In 1400   Time Out 1430   Timed Code Minutes: 30 min   Total Treatment Time:   30 min       Electronically Signed by: Nayeli Aguilar, PTA [id number]

## 2021-12-06 ENCOUNTER — HOSPITAL ENCOUNTER (OUTPATIENT)
Dept: PHYSICAL THERAPY | Age: 31
Setting detail: THERAPIES SERIES
Discharge: HOME | End: 2021-12-06
Payer: COMMERCIAL

## 2021-12-06 PROCEDURE — 97110 THERAPEUTIC EXERCISES: CPT

## 2021-12-06 NOTE — PROGRESS NOTES
[address] [facility]  PHYSICAL THERAPY  [x] DAILY NOTE (LAND) [] DAILY NOTE (AQUATIC ) [] PROGRESS NOTE [] DISCHARGE NOTE    [] [address] [facility]   [x] [address]    [] [facility]   [] Margaret Galeazzi    Date: 2021  Patient Name:  El Justice  : 1990  MRN: [id number]  CSN: [id number]    Referring Practitioner Bailey Isaac MD   Diagnosis Sprain of anterior cruciate ligament of right knee, subsequent encounter, Other tear of medial meniscus, current injury, right knee, subsequent encounter, Tear of articular cartilage of right knee, current, subsequent encounter   Treatment Diagnosis S83.241A, S83.511A, S83.31XA   Date of Evaluation 21   Additional Pertinent History R ACL reconstruction  Dr. Mazin Sagastume. 2nd ACL reconstruction and meniscus repair on 21 by Dr. Edgar Larkin. Functional Outcome Measure Used LEFS    Functional Outcome Score eval score 3 (21)       Insurance: Primary: Payor: Iris Sood /  /  / ,   Secondary:    Authorization Information: 12 visits, pays at 100%, - 10/01 + 24 more visits from 9/15-11/10/21= 36 visits   Visit # 31, 1011 for progress note + start 2nd new C9 9/15/21-21 for 24 more visits   Visits Allowed: 36   Recertification Date:    Physician Follow-Up: Dec 13, 2021   Physician Orders: No RTW until after 21 f/u with MD   History of Present Illness:      SUBJECTIVE:  Reports increased soreness today d/u walking a lot over the weekend. Pain 2-3/10. Pt reports completing HEP \"when able\".     OBJECTIVE:  TREATMENT   Precautions: PWB 50% with crutches 4 weeks from - 10/20, FWB  Without crutches on 10/21   Pain: 1/10 R knee walking     X in shaded column indicates activity completed today   Modalities Parameters/  Location  Notes                     Manual Therapy Time/Technique  Notes                     Exercise/Intervention   Notes   Bike seat 2 5 minutes full rev x    Heel slide with strap 15      Quad set 15 5 sec  AROM R knee 0- 130 degrees   DF belt stretch 5 15 sec     SLR flexion 15 5     SLR 4 way 20 5     HS, calf and knee flex stretch at step  3x 20 sec  x    Total gym squat  Heel raises 35  20  X  x   Level 8   rockerboard forward/back, side/side 30  x    Heel raises B on foam 15  x    Nautilus leg press 40# 2 x 15  x    3 way hip on foam  15  x B   Standing TKE  15 5  With orange tband   Step up 4 inch step forward and lat with R 10  x      Specific Interventions Next Treatment: NWB R ACL protocol without meniscus repair, No restrictions with AROM, brace locked in 0 degrees at all times except with PT and HEP    Activity/Treatment Tolerance:  [x]  Patient tolerated treatment well  []  Patient limited by fatigue  []  Patient limited by pain   []  Patient limited by medical complications  []  Other:     Assessment: Pt tolerated session well, no increased pain after session or during exs. Plan to progress as able next visit. Discussed again importance of HEP and consistency. GOALS:  Patient Goal: to get my knee back to normal    REVISED GOALS  Short Term Goals:  Time Frame: deferred to LTG's    Long Term Goals:  Time Frame: 5 weeks  1. Increase AROM R hip flexion to 80, knee 0- 135 degrees to allow patient to report able to walk with normal gait pattern with no pain. 2. Increase strength R LE to 4/5 to allow patient to go up and down steps reciprocally with no pain. 3. I with HEP as prescribed to allow patient to walk x 30 minutes at grocery with no R knee pain. Patient Education:    []  HEP/Education Completed:3 way hip  []  No new Education completed  [x]  Reviewed Prior HEP      [x]  Patient verbalized and/or demonstrated understanding of education provided. []  Patient unable to verbalize and/or demonstrate understanding of education provided. Will continue education.   [x]  Barriers to learning: none    PLAN:  Treatment Recommendations: Strengthening, Range of Motion, Balance Training, Gait Training, Stair Training, Home Exercise Program and Patient Education   Plan to see patient 2 times per week for 5 weeks to address the treatment planned outlined above.   [x]  Continue with current plan of care  []  Modify plan of care as follows:    []  Hold pending physician visit  []  Discharge    Time In 1135   Time Out 1205   Timed Code Minutes: 30 min   Total Treatment Time:    30 min       Electronically Signed by: Tracie Watson, OPAL [id number]

## 2021-12-08 ENCOUNTER — HOSPITAL ENCOUNTER (OUTPATIENT)
Dept: PHYSICAL THERAPY | Age: 31
Setting detail: THERAPIES SERIES
Discharge: HOME | End: 2021-12-08
Payer: COMMERCIAL

## 2021-12-08 PROCEDURE — 97110 THERAPEUTIC EXERCISES: CPT

## 2021-12-08 NOTE — PROGRESS NOTES
[address] [facility]  PHYSICAL THERAPY  [] DAILY NOTE (LAND) [] DAILY NOTE (AQUATIC ) [x] PROGRESS NOTE [] DISCHARGE NOTE    [] [facility]   [x] [place] [address]    [] [facility]   [] [facility]    Date: 2021  Patient Name:  Anne Aleman  : 1990  MRN: [id number]  CSN: [id number]    Referring Practitioner Delaney Hernandez MD   Diagnosis Sprain of anterior cruciate ligament of right knee, subsequent encounter, Other tear of medial meniscus, current injury, right knee, subsequent encounter, Tear of articular cartilage of right knee, current, subsequent encounter   Treatment Diagnosis S83.241A, S83.511A, S83.31XA   Date of Evaluation 21   Additional Pertinent History R ACL reconstruction  Dr. Stan Elkins. 2nd ACL reconstruction and meniscus repair on 21 by Dr. Quynh Mata. Functional Outcome Measure Used LEFS    Functional Outcome Score eval score 3 (21)       Insurance: Primary: Payor: Jeannine Najera /  /  / ,   Secondary:    Authorization Information: 12 visits, pays at 100%, - 10/01 + 24 more visits from 9/15-11/10/21= 36 visits   Visit # 32, 11 for progress note + start 2nd new C9 9/15/21-21 for 24 more visits   Visits Allowed: 36   Recertification Date:    Physician Follow-Up: Dec 13, 2021   Physician Orders: No RTW until after 21 f/u with MD   History of Present Illness:      SUBJECTIVE:  Pt reports right knee buckled on her later in day Monday after therapy when she twisting too quickly and again yesterday when weight shifting laterally. Pt noting increased pain, feels some of it may be due to barometric changes.      OBJECTIVE:  TREATMENT   Precautions: PWB 50% with crutches 4 weeks from - 10/20, FWB  Without crutches on 10/21   Pain: 3/10 R knee walking     X in shaded column indicates activity completed today   Modalities Parameters/  Location  Notes                     Manual Continue Goal  2. Increase strength R LE to 4/5 to allow patient to go up and down steps reciprocally with no pain. GOAL NOT MET: right LE 5/5 but unable to negotiate stairs reciprocally d/t pain and instabiliity. Continue Goal  3. I with HEP as prescribed to allow patient to walk x 30 minutes at grocery with no R knee pain. GOAL NOT MET: Pt reports poor compliance with HEP d/t poor sleep schedule and mental health issues; she ambulated in [place] [facility] at least 30 minutes but needed cane by end d/t pain and fatigue. Continue Goal  +    Patient Education:    []  HEP/Education Completed:3 way hip  []  No new Education completed  [x]  Reviewed Prior HEP      [x]  Patient verbalized and/or demonstrated understanding of education provided. []  Patient unable to verbalize and/or demonstrate understanding of education provided. Will continue education. []  Barriers to learning: none    PLAN:  Treatment Recommendations: Strengthening, Range of Motion, Balance Training, Gait Training, Stair Training, Home Exercise Program and Patient Education   Plan to see patient 2 times per week for 5 weeks to address the treatment planned outlined above.   [x]  Continue with current plan of care  []  Modify plan of care as follows:    []  Hold pending physician visit  []  Discharge    Time In 1115   Time Out 1153   Timed Code Minutes: 38 min   Total Treatment Time:    38 min       Electronically Signed by: Arielle Liz, PT

## 2021-12-17 ENCOUNTER — HOSPITAL ENCOUNTER (OUTPATIENT)
Dept: PHYSICAL THERAPY | Age: 31
Setting detail: THERAPIES SERIES
Discharge: HOME | End: 2021-12-17
Payer: COMMERCIAL

## 2021-12-17 PROCEDURE — 97110 THERAPEUTIC EXERCISES: CPT

## 2021-12-17 NOTE — PROGRESS NOTES
[address] [facility]  PHYSICAL THERAPY  [x] DAILY NOTE (LAND) [] DAILY NOTE (AQUATIC ) [] PROGRESS NOTE [] DISCHARGE NOTE    [] [address] [facility]   [x] [place] [address]    [] [facility]   [] Tommy Chun    Date: 2021  Patient Name:  Savannah Argueta  : 1990  MRN: [id number]  CSN: [id number]    Referring Practitioner Dejon Green MD   Diagnosis Sprain of anterior cruciate ligament of right knee, subsequent encounter, Other tear of medial meniscus, current injury, right knee, subsequent encounter, Tear of articular cartilage of right knee, current, subsequent encounter   Treatment Diagnosis S83.241A, S83.511A, S83.31XA   Date of Evaluation 21   Additional Pertinent History R ACL reconstruction  Dr. Ángel Joshi. 2nd ACL reconstruction and meniscus repair on 21 by Dr. Cruz Orr. Functional Outcome Measure Used LEFS    Functional Outcome Score eval score 3 (21)       Insurance: Primary: Payor: Lajuanda Ganser /  /  / ,   Secondary:    Authorization Information: 12 visits, pays at 100%, - 10/01 + 24 more visits from 9/15-11/10/21= 36 visits   Visit # 33, 1/4 for progress note + start 2nd new C9 9/15/21-21 for 24 more visits   Visits Allowed: 36   Recertification Date:    Physician Follow-Up: Dec 13, 2021   Physician Orders: No RTW until after 21 f/u with MD   History of Present Illness:      SUBJECTIVE:  Pt reports she was able to negotiate her 3 small steps between rooms reciprocally today. Pt denies current pain. Pt reports new C9 was approved to continue therapy. Pt was released to return to work on light duty but [employer] unable to offer her light duty hours. Worker's comp  communicating with [employer] to figure out return to work solution.      OBJECTIVE:  TREATMENT   Precautions:    Pain: 0/10 R knee    X in shaded column indicates activity completed today   Modalities Parameters/  Location Notes                     Manual Therapy Time/Technique  Notes                     Exercise/Intervention   Notes   Bike seat 2 5 minutes full rev  x    Heel slide with strap 15      Quad set 15 5 sec  AROM R knee 0- 130 degrees   DF belt stretch 5 15 sec     SLR flexion 15 5     SLR 4 way 20 5     HS, calf and knee flex stretch at step  3x 20 sec  x    Total gym squat  Heel raises 35  20  X  x   Level 8   rockerboard forward/back, side/side 30  x    Heel raises B on foam 20  x    Nautilus leg press 40# 2 x 15  x    3 way hip on foam  15  x B   Standing TKE  15 5 x With orange tband   Step up 4 inch step forward and lat  10 B  x    R SLS in slight knee flexion with 3 way toe tap 5  x difficult   2 inch eccentric step lower, RCC 4  x painful     Specific Interventions Next Treatment: NWB R ACL protocol without meniscus repair, No restrictions with AROM, brace locked in 0 degrees at all times except with PT and HEP    Activity/Treatment Tolerance:  [x]  Patient tolerated treatment well  []  Patient limited by fatigue  []  Patient limited by pain   []  Patient limited by medical complications  []  Other:     Assessment: Focused on strengthening, adding eccentric exercises with challenge noted. Pain after eccentric exercises so instructed to ice when she gets home. Plan to continue with remaining approved visits and will schedule more based on new C9 approval.     GOALS:  Patient Goal: to get my knee back to normal    Short Term Goals:  Time Frame: deferred to LTG's    Long Term Goals:  Time Frame: 5 weeks  1. Increase AROM R hip flexion to 80, knee 0- 135 degrees to allow patient to report able to walk with normal gait pattern with no pain. 2. Increase strength R LE to 4/5 to allow patient to go up and down steps reciprocally with no pain.       3. I with HEP as prescribed to allow patient to walk x 30 minutes at grocery with no R knee pain.   +    Patient Education:   [x]  HEP/Education Completed: ice, monitor response to progressions  []  No new Education completed  [x]  Reviewed Prior HEP      [x]  Patient verbalized and/or demonstrated understanding of education provided. []  Patient unable to verbalize and/or demonstrate understanding of education provided. Will continue education. []  Barriers to learning: none    PLAN:  Treatment Recommendations: Strengthening, Range of Motion, Balance Training, Gait Training, Stair Training, Home Exercise Program and Patient Education   Plan to see patient 2 times per week for 5 weeks to address the treatment planned outlined above.   [x]  Continue with current plan of care  []  Modify plan of care as follows:    []  Hold pending physician visit  []  Discharge    Time In 1330   Time Out 1409   Timed Code Minutes: 39 min   Total Treatment Time:    39 min       Electronically Signed by: Suzanne Rudolph PT

## 2021-12-20 ENCOUNTER — HOSPITAL ENCOUNTER (OUTPATIENT)
Dept: PHYSICAL THERAPY | Age: 31
Setting detail: THERAPIES SERIES
Discharge: HOME | End: 2021-12-20
Payer: COMMERCIAL

## 2021-12-20 PROCEDURE — 97110 THERAPEUTIC EXERCISES: CPT

## 2021-12-20 NOTE — PROGRESS NOTES
R knee 0- 130 degrees   DF belt stretch 5 15 sec     SLR flexion 15 5     SLR 4 way 20 5     HS, calf and knee flex stretch at step  3x 20 sec  x    Total gym squat  Heel raises 35  20  X  x   Level 8   rockerboard forward/back, side/side 30  x    Heel raises B on foam 20  x    Nautilus leg press 40# 2 x 15  x    3 way hip on foam  15-20  x B, one UE for support   Standing TKE  15 5  With orange tband   Step up 4 inch step forward and lat  10 B  x    R SLS in slight knee flexion with 3 way toe tap 5   Difficult, unable to do today   2 inch eccentric step lower, RCC 4   painful     Specific Interventions Next Treatment: NWB R ACL protocol without meniscus repair, No restrictions with AROM, brace locked in 0 degrees at all times except with PT and HEP    Activity/Treatment Tolerance:  []  Patient tolerated treatment well  []  Patient limited by fatigue  [x]  Patient limited by pain   []  Patient limited by medical complications  []  Other:     Assessment: Pt unable to tolerate new exs today d/t increased pain, 2/10 at end of session. Pt to continue with HEP as able, 2x a day. GOALS:  Patient Goal: to get my knee back to normal    Short Term Goals:  Time Frame: deferred to LTG's    Long Term Goals:  Time Frame: 5 weeks  1. Increase AROM R hip flexion to 80, knee 0- 135 degrees to allow patient to report able to walk with normal gait pattern with no pain. 2. Increase strength R LE to 4/5 to allow patient to go up and down steps reciprocally with no pain. 3. I with HEP as prescribed to allow patient to walk x 30 minutes at grocery with no R knee pain.   +    Patient Education:   [x]  HEP/Education Completed: ice, monitor response to progressions  []  No new Education completed  [x]  Reviewed Prior HEP      [x]  Patient verbalized and/or demonstrated understanding of education provided. []  Patient unable to verbalize and/or demonstrate understanding of education provided. Will continue education.   [] Barriers to learning: none    PLAN:  Treatment Recommendations: Strengthening, Range of Motion, Balance Training, Gait Training, Stair Training, Home Exercise Program and Patient Education   Plan to see patient 2 times per week for 5 weeks to address the treatment planned outlined above.   [x]  Continue with current plan of care  []  Modify plan of care as follows:    []  Hold pending physician visit  []  Discharge    Time In 1500   Time Out 1530   Timed Code Minutes: 30 min   Total Treatment Time:    30 min       Electronically Signed by: Damari Live PTA

## 2021-12-22 ENCOUNTER — APPOINTMENT (OUTPATIENT)
Dept: PHYSICAL THERAPY | Age: 31
End: 2021-12-22
Payer: COMMERCIAL

## 2021-12-27 ENCOUNTER — HOSPITAL ENCOUNTER (OUTPATIENT)
Dept: PHYSICAL THERAPY | Age: 31
Setting detail: THERAPIES SERIES
Discharge: HOME | End: 2021-12-27
Payer: COMMERCIAL

## 2021-12-27 PROCEDURE — 97110 THERAPEUTIC EXERCISES: CPT

## 2021-12-27 NOTE — PROGRESS NOTES
[facility]  PHYSICAL THERAPY  [x] DAILY NOTE (LAND) [] DAILY NOTE (AQUATIC ) [] PROGRESS NOTE [] DISCHARGE NOTE    [] [address] [facility]   [x] [address]    [] [facility]   [] Selena Citizen    Date: 2021  Patient Name:  Eleazar Silva  : 1990  MRN: [id number]  CSN: [id number]    Referring Practitioner Alley Hernandez MD   Diagnosis Sprain of anterior cruciate ligament of right knee, subsequent encounter, Other tear of medial meniscus, current injury, right knee, subsequent encounter, Tear of articular cartilage of right knee, current, subsequent encounter   Treatment Diagnosis S83.241A, S83.511A, S83.31XA   Date of Evaluation 21   Additional Pertinent History R ACL reconstruction  Dr. Vicente Last. 2nd ACL reconstruction and meniscus repair on 21 by Dr. Evangelina Murillo. Functional Outcome Measure Used LEFS    Functional Outcome Score eval score 3 (21)       Insurance: Primary: Payor: Laretta Cockayne /  /  / ,   Secondary:    Authorization Information: 12 visits, pays at 100%, - 10/01 + 24 more visits from 9/15-11/10/21= 36 visits   Visit # 35, 3/4 for progress note + start 2nd new C9 9/15/21-21 for 24 more visits   Visits Allowed: 36   Recertification Date:    Physician Follow-Up: Dec 13, 2021   Physician Orders: No RTW until after 21 f/u with MD   History of Present Illness:      SUBJECTIVE:  Pt states she had to cx last appt d/t being in too much pain (5/10). Reports pain has gotten better and barely has any today. Pt states she has not been doing her exs regularly since Dec 23rd.     OBJECTIVE:  TREATMENT   Precautions:    Pain: 0-1/10 R knee    X in shaded column indicates activity completed today   Modalities Parameters/  Location  Notes                     Manual Therapy Time/Technique  Notes                     Exercise/Intervention   Notes   Bike seat 2 5 minutes full rev  x    Heel slide with strap 15      Quad set 15 5 sec  AROM R knee 0- 130 degrees   DF belt stretch 5 15 sec     SLR flexion 15 5     SLR 4 way 20 5     HS, calf and knee flex stretch at step  3x 20 sec  x    Total gym squat  Heel raises 35  20  X  x   Level 8   rockerboard forward/back, side/side 30  x    Heel raises B on foam 20  x    Nautilus leg press 40# 2 x 15  x    3 way hip on foam  20  x B, one UE for support   Standing TKE  15 5  With orange tband   Step up 4 inch step forward and lat  10 B  x    R SLS in slight knee flexion with 3 way toe tap 5  x Difficult, increased pain 2/10 after   2 inch eccentric step lower, RCC 4   painful     Specific Interventions Next Treatment: NWB R ACL protocol without meniscus repair, No restrictions with AROM, brace locked in 0 degrees at all times except with PT and HEP    Activity/Treatment Tolerance:  []  Patient tolerated treatment well  [x]  Patient limited by fatigue  [x]  Patient limited by pain   []  Patient limited by medical complications  []  Other:     Assessment: Pt tolerated session fairly well, mod mm fatigue with R SLS during exs, increased pain with SLS toe taps. Discussed importance of HEP consistency before returning to work. GOALS:  Patient Goal: to get my knee back to normal    Short Term Goals:  Time Frame: deferred to LTG's    Long Term Goals:  Time Frame: 5 weeks  1. Increase AROM R hip flexion to 80, knee 0- 135 degrees to allow patient to report able to walk with normal gait pattern with no pain. 2. Increase strength R LE to 4/5 to allow patient to go up and down steps reciprocally with no pain. 3. I with HEP as prescribed to allow patient to walk x 30 minutes at grocery with no R knee pain.   +    Patient Education:   [x]  HEP/Education Completed: ice, monitor response to progressions  []  No new Education completed  [x]  Reviewed Prior HEP      [x]  Patient verbalized and/or demonstrated understanding of education provided.   []  Patient unable to verbalize and/or demonstrate understanding of education provided. Will continue education. []  Barriers to learning: none    PLAN:  Treatment Recommendations: Strengthening, Range of Motion, Balance Training, Gait Training, Stair Training, Home Exercise Program and Patient Education   Plan to see patient 2 times per week for 5 weeks to address the treatment planned outlined above.   [x]  Continue with current plan of care  []  Modify plan of care as follows:    []  Hold pending physician visit  []  Discharge    Time In 1500   Time Out 1541   Timed Code Minutes: 41 min   Total Treatment Time:    41 min       Electronically Signed by: Rocky Johnson PTA

## 2021-12-30 ENCOUNTER — HOSPITAL ENCOUNTER (OUTPATIENT)
Dept: PHYSICAL THERAPY | Age: 31
Setting detail: THERAPIES SERIES
Discharge: HOME | End: 2021-12-30
Payer: COMMERCIAL

## 2021-12-30 PROCEDURE — 97110 THERAPEUTIC EXERCISES: CPT

## 2021-12-30 NOTE — PROGRESS NOTES
[address] [facility]  PHYSICAL THERAPY  [x] DAILY NOTE (LAND) [] DAILY NOTE (AQUATIC ) [] PROGRESS NOTE [] DISCHARGE NOTE    [] [facility]   [x] [address]    [] [facility]   [] Shakira Aus    Date: 2021  Patient Name:  Tuan Irizarry  : 1990  MRN: [id number]  CSN: [id number]    Referring Practitioner Fiorella Stevenson MD   Diagnosis Sprain of anterior cruciate ligament of right knee, subsequent encounter, Other tear of medial meniscus, current injury, right knee, subsequent encounter, Tear of articular cartilage of right knee, current, subsequent encounter   Treatment Diagnosis S83.241A, S83.511A, S83.31XA   Date of Evaluation 21   Additional Pertinent History R ACL reconstruction  Dr. Mira Ruiz. 2nd ACL reconstruction and meniscus repair on 21 by Dr. Tate Hassan. Functional Outcome Measure Used LEFS    Functional Outcome Score eval score 3 (21)       Insurance: Primary: Payor: Latha Covarrubias /  /  / ,   Secondary:    Authorization Information: 12 visits, pays at 100%, - 10/01 + 24 more visits from 9/15-11/10/21= 36 visits   Visit # 36, 4/4 for progress note + start 2nd new C9 9/15/21-21 for 24 more visitsRECEIVED A NEW C9 FOR 1-2 TIMES A WEEK FOR 6 WEEKS STARTING 12/15/21 WITH NO END DATE    Visits Allowed: 36   Recertification Date: 05/15/76   Physician Follow-Up: Dec 13, 2021   Physician Orders: No RTW until after 21 f/u with MD   History of Present Illness:      SUBJECTIVE:  Patient reporting pain level 1/10 today and reporting no other complains at this time.     OBJECTIVE:  TREATMENT   Precautions:    Pain: 0-1/10 R knee    X in shaded column indicates activity completed today   Modalities Parameters/  Location  Notes                     Manual Therapy Time/Technique  Notes                     Exercise/Intervention   Notes   Bike seat 2 5 minutes full rev  x    Heel slide with strap 15 Quad set 15 5 sec  AROM R knee 0- 130 degrees   DF belt stretch 5 15 sec     SLR flexion 15 5     SLR 4 way 20 5     HS, calf and knee flex stretch at step  3x 20 sec  x    Total gym squat  Heel raises  R leg mini squats 35  20  5  X  X  x Level 8   rockerboard forward/back, side/side 30  x    Heel raises B on foam 20  x    Nautilus leg press 40# 2 x 15  x    3 way hip on foam  20  x B, one UE for support   Standing TKE  15 5  With orange tband   Step up 4 inch step forward and lat  10 B  x    R SLS in slight knee flexion with 3 way toe tap 5   Difficult, increased pain 2/10 after   2 inch eccentric step lower, RCC 4   painful     Specific Interventions Next Treatment: NWB R ACL protocol without meniscus repair, No restrictions with AROM, brace locked in 0 degrees at all times except with PT and HEP    Activity/Treatment Tolerance:  []  Patient tolerated treatment well  [x]  Patient limited by fatigue  [x]  Patient limited by pain   []  Patient limited by medical complications  []  Other:     Assessment: Did single leg at total gym with patient tolerating well but having noted fatigue. GOALS:  Patient Goal: to get my knee back to normal    Short Term Goals:  Time Frame: deferred to LTG's    Long Term Goals:  Time Frame: 5 weeks  1. Increase AROM R hip flexion to 80, knee 0- 135 degrees to allow patient to report able to walk with normal gait pattern with no pain. 2. Increase strength R LE to 4/5 to allow patient to go up and down steps reciprocally with no pain. 3. I with HEP as prescribed to allow patient to walk x 30 minutes at grocery with no R knee pain.   +    Patient Education:   [x]  HEP/Education Completed: ice, monitor response to progressions  []  No new Education completed  [x]  Reviewed Prior HEP      [x]  Patient verbalized and/or demonstrated understanding of education provided. []  Patient unable to verbalize and/or demonstrate understanding of education provided.   Will continue education. []  Barriers to learning: none    PLAN:  Treatment Recommendations: Strengthening, Range of Motion, Balance Training, Gait Training, Stair Training, Home Exercise Program and Patient Education   Plan to see patient 2 times per week for 5 weeks to address the treatment planned outlined above.   [x]  Continue with current plan of care  []  Modify plan of care as follows:    []  Hold pending physician visit  []  Discharge    Time In 1509   Time Out 1533   Timed Code Minutes: 25 min   Total Treatment Time:    24 min       Electronically Signed by: Brandie Acevedo PTA

## 2022-01-05 ENCOUNTER — HOSPITAL ENCOUNTER (OUTPATIENT)
Dept: PHYSICAL THERAPY | Age: 32
Setting detail: THERAPIES SERIES
Discharge: HOME | End: 2022-01-05
Payer: COMMERCIAL

## 2022-01-05 PROCEDURE — 97110 THERAPEUTIC EXERCISES: CPT

## 2022-01-05 NOTE — PROGRESS NOTES
[place]  PHYSICAL THERAPY  [x] DAILY NOTE (LAND) [] DAILY NOTE (AQUATIC ) [] PROGRESS NOTE [] DISCHARGE NOTE    [] [facility]   [x] Joy 89    [] [facility]   [] Rachell Primrose    Date: 2022  Patient Name:  Monique Rivera  : 1990  MRN: [id number]  CSN: [id number]    Referring Practitioner Terrence Downs MD   Diagnosis Sprain of anterior cruciate ligament of right knee, subsequent encounter, Other tear of medial meniscus, current injury, right knee, subsequent encounter, Tear of articular cartilage of right knee, current, subsequent encounter   Treatment Diagnosis S83.241A, S83.511A, S83.31XA   Date of Evaluation 21   Additional Pertinent History R ACL reconstruction  Dr. Emmie Thompson. 2nd ACL reconstruction and meniscus repair on 21 by Dr. Amber Starks. Functional Outcome Measure Used LEFS    Functional Outcome Score eval score 3 (21)       Insurance: Primary: Payor: Shagufta Briseno /  /  / ,   Secondary:    Authorization Information: 12 visits, pays at 100%, - 10/01 + 24 more visits from 9/15-11/10/21= 36 visits   Visit # 37, 5/ for progress note + start 2nd new C9 9/15/21-21 for 24 more visitsRECEIVED A NEW C9 FOR 1-2 TIMES A WEEK FOR 6 WEEKS STARTING 12/15/21 WITH NO END DATE    Visits Allowed: 24   Recertification Date:    Physician Follow-Up: Dec 13, 2021   Physician Orders: No RTW until after 21 f/u with MD   History of Present Illness:      SUBJECTIVE:  Patient states min to no pain today. States unable to sleep at night and was too tired to come last session. Pt reports being non compliant with HEP, doing some exs once, every other day.     OBJECTIVE:  TREATMENT   Precautions:    Pain: 0-1/10 R knee    X in shaded column indicates activity completed today   Modalities Parameters/  Location  Notes                     Manual Therapy Time/Technique  Notes Exercise/Intervention   Notes   Bike seat 2 5 minutes full rev  x    Heel slide with strap 15      Quad set 15 5 sec  AROM R knee 0- 130 degrees   DF belt stretch 5 15 sec     SLR flexion 15 5     SLR 4 way 20 5     HS, calf and knee flex stretch at step  3x 20 sec  x    Total gym squat  Heel raises  R leg mini squats 35  20  5  X  X  x Level 8   rockerboard forward/back, side/side 30 s/s  15 f/b  X  x   SL R   Heel raises B on foam 20      Nautilus leg press 50# 2 x 10  x Progressed weight 1/5   3 way hip on foam  20  x B, one UE for support   Standing TKE  15 5  With orange tband   Step up 4 inch step forward and lat  10 B  x Fwd 6 inch, lat 4 inch-progressed 1/5   R SLS in slight knee flexion with 3 way toe tap 5   Difficult, increased pain 2/10 after   2 inch eccentric step lower, RCC 4   painful     Specific Interventions Next Treatment: NWB R ACL protocol without meniscus repair, No restrictions with AROM, brace locked in 0 degrees at all times except with PT and HEP    Activity/Treatment Tolerance:  []  Patient tolerated treatment well  [x]  Patient limited by fatigue  [x]  Patient limited by pain   []  Patient limited by medical complications  []  Other:     Assessment: Completed single leg Rockerboard forward and backward with mod/max fatigue noted. Discussed importance of HEP consistency to improve strength and endurance, and to be able to progress exs during session. GOALS:  Patient Goal: to get my knee back to normal    Short Term Goals:  Time Frame: deferred to LT's    Long Term Goals:  Time Frame: 5 weeks  1. Increase AROM R hip flexion to 80, knee 0- 135 degrees to allow patient to report able to walk with normal gait pattern with no pain. 2. Increase strength R LE to 4/5 to allow patient to go up and down steps reciprocally with no pain.       3. I with HEP as prescribed to allow patient to walk x 30 minutes at grocery with no R knee pain.   +    Patient Education:   [x]  HEP/Education Completed: ice, monitor response to progressions  []  No new Education completed  [x]  Reviewed Prior HEP      [x]  Patient verbalized and/or demonstrated understanding of education provided. []  Patient unable to verbalize and/or demonstrate understanding of education provided. Will continue education. []  Barriers to learning: none    PLAN:  Treatment Recommendations: Strengthening, Range of Motion, Balance Training, Gait Training, Stair Training, Home Exercise Program and Patient Education   Plan to see patient 2 times per week for 5 weeks to address the treatment planned outlined above.   [x]  Continue with current plan of care  []  Modify plan of care as follows:    []  Hold pending physician visit  []  Discharge    Time In 1245   Time Out 1315   Timed Code Minutes: 30 min   Total Treatment Time:    30  min       Electronically Signed by: Erik Faustin PTA

## 2022-01-10 ENCOUNTER — HOSPITAL ENCOUNTER (OUTPATIENT)
Dept: PHYSICAL THERAPY | Age: 32
Setting detail: THERAPIES SERIES
Discharge: HOME | End: 2022-01-10
Payer: COMMERCIAL

## 2022-01-10 PROCEDURE — 97110 THERAPEUTIC EXERCISES: CPT

## 2022-01-10 NOTE — PROGRESS NOTES
[place]  PHYSICAL THERAPY  [x] DAILY NOTE (LAND) [] DAILY NOTE (AQUATIC ) [] PROGRESS NOTE [] DISCHARGE NOTE    [] [address] [facility]   [x] [address]    [] [facility]   [] Melvern Stage    Date: 1/10/2022  Patient Name:  Andres Flanagan  : 1990  MRN: [id number]  CSN: [id number]    Referring Practitioner Manuel Hancock MD   Diagnosis Sprain of anterior cruciate ligament of right knee, subsequent encounter, Other tear of medial meniscus, current injury, right knee, subsequent encounter, Tear of articular cartilage of right knee, current, subsequent encounter   Treatment Diagnosis S83.241A, S83.511A, S83.31XA   Date of Evaluation 21   Additional Pertinent History R ACL reconstruction  Dr. Mario Alberto Acosta. 2nd ACL reconstruction and meniscus repair on 21 by Dr. Amber Padilla. Functional Outcome Measure Used LEFS    Functional Outcome Score eval score 3 (21)       Insurance: Primary: Payor: Lynn Pratt /  /  / ,   Secondary:    Authorization Information: 12 visits, pays at 100%, - 10/01 + 24 more visits from 9/15-11/10/21= 36 visits   Visit # 38,  for progress note + start 2nd new C9 9/15/21-21 for 24 more visitsRECEIVED A NEW C9 FOR 1-2 TIMES A WEEK FOR 6 WEEKS STARTING 12/15/21 WITH NO END DATE    Visits Allowed: 24   Recertification Date: 13   Physician Follow-Up: 22   Physician Orders:    History of Present Illness:      SUBJECTIVE:  Patient denies current pain. Pt reports knee still locks up on her occasionally and twisting catches her off guard. Pt still working with [employer] comp on return to work.      OBJECTIVE:  TREATMENT   Precautions:    Pain: 0/10 R knee    X in shaded column indicates activity completed today   Modalities Parameters/  Location  Notes                     Manual Therapy Time/Technique  Notes                     Exercise/Intervention   Notes   Bike seat 2 5 minutes full rev  x    Heel slide with strap 15      Quad set 15 5 sec  AROM R knee 0- 130 degrees   DF belt stretch 5 15 sec     SLR flexion 15 5     SLR 4 way 20 5     HS, calf and knee flex stretch at step  3x 20 sec  x    Total gym squat  Heel raises  R leg mini squats 35  20  10  X  X  x Level 8   rockerboard forward/back, side/side 30 s/s  15 f/b  X  x   SL R   Heel raises B on foam 20      Nautilus leg press 50# 2 x 10   Progressed weight 1/5   3 way hip on foam  20  x B, one UE for support   Standing TKE  15 5  With orange tband   Step up forward and lat  10 B  x Fwd 6 inch, lat 4 inch-progressed 1/5   R SLS in slight knee flexion with 3 way toe tap 5   Difficult, increased pain 2/10 after   2 inch eccentric step lower, RCC 4   painful     Specific Interventions Next Treatment:  R ACL protocol without meniscus repair, No restrictions with AROM    Activity/Treatment Tolerance:  [x]  Patient tolerated treatment well  [x]  Patient limited by fatigue  []  Patient limited by pain   []  Patient limited by medical complications  []  Other:     Assessment: Progressed reps with single leg TG squats, otherwise no progressions. Pt reports muscle pain at end of session. Continue progressing strength as tolerated. GOALS:  Patient Goal: to get my knee back to normal    Short Term Goals:  Time Frame: deferred to LTG's    Long Term Goals:  Time Frame: 5 weeks  1. Increase AROM R hip flexion to 80, knee 0- 135 degrees to allow patient to report able to walk with normal gait pattern with no pain. 2. Increase strength R LE to 4/5 to allow patient to go up and down steps reciprocally with no pain. 3. I with HEP as prescribed to allow patient to walk x 30 minutes at grocery with no R knee pain.        Patient Education:   []  HEP/Education Completed: ice, monitor response to progressions  []  No new Education completed  [x]  Reviewed Prior HEP      [x]  Patient verbalized and/or demonstrated understanding of education provided. []  Patient unable to verbalize and/or demonstrate understanding of education provided. Will continue education. []  Barriers to learning: none    PLAN:  Treatment Recommendations: Strengthening, Range of Motion, Balance Training, Gait Training, Stair Training, Home Exercise Program and Patient Education   Plan to see patient 2 times per week for 5 weeks to address the treatment planned outlined above.   [x]  Continue with current plan of care  []  Modify plan of care as follows:    []  Hold pending physician visit  []  Discharge    Time In 1247   Time Out 1315   Timed Code Minutes: 28 min   Total Treatment Time:    28  min       Electronically Signed by: Carlene Zheng, PT

## 2022-01-12 ENCOUNTER — HOSPITAL ENCOUNTER (OUTPATIENT)
Dept: PHYSICAL THERAPY | Age: 32
Setting detail: THERAPIES SERIES
Discharge: HOME | End: 2022-01-12
Payer: COMMERCIAL

## 2022-01-12 PROCEDURE — 97110 THERAPEUTIC EXERCISES: CPT

## 2022-01-12 NOTE — PROGRESS NOTES
[address] [facility]  PHYSICAL THERAPY  [x] DAILY NOTE (LAND) [] DAILY NOTE (AQUATIC ) [] PROGRESS NOTE [] DISCHARGE NOTE    [] [address] [facility]   [x] Joy [address]    [] [facility]   [] Albino Slate    Date: 2022  Patient Name:  Libby Mendez  : 1990  MRN: [id number]  CSN: [id number]    Referring Practitioner Jake Weiner MD   Diagnosis Sprain of anterior cruciate ligament of right knee, subsequent encounter, Other tear of medial meniscus, current injury, right knee, subsequent encounter, Tear of articular cartilage of right knee, current, subsequent encounter   Treatment Diagnosis S83.241A, S83.511A, S83.31XA   Date of Evaluation 21   Additional Pertinent History R ACL reconstruction  Dr. David Velazquez. 2nd ACL reconstruction and meniscus repair on 21 by Dr. Wade Gayle. Functional Outcome Measure Used LEFS    Functional Outcome Score eval score 3 (21)       Insurance: Primary: Payor: [facility] /  /  / ,   Secondary:    Authorization Information: 12 visits, pays at 100%, - 10/01 + 24 more visits from 9/15-11/10/21= 36 visits   Visit # 39, 7/8 for progress note + start 2nd new C9 9/15/21-21 for 24 more visitsRECEIVED A NEW C9 FOR 1-2 TIMES A WEEK FOR 6 WEEKS STARTING 12/15/21 WITH NO END DATE    Visits Allowed: 24   Recertification Date:    Physician Follow-Up: 22   Physician Orders:    History of Present Illness:      SUBJECTIVE:  Patient states some soreness today d/t walking more than usual yesterday, .10.     OBJECTIVE:  TREATMENT   Precautions:    Pain: .5 10 R knee    X in shaded column indicates activity completed today   Modalities Parameters/  Location  Notes                     Manual Therapy Time/Technique  Notes                     Exercise/Intervention   Notes   Bike seat 2 5 minutes full rev  x    Heel slide with strap 15      Quad set 15 5 sec  AROM R knee 0- 130 degrees DF belt stretch 5 15 sec     SLR flexion 15 5     SLR 4 way 20 5     HS, calf and knee flex stretch at step  3x 20 sec  x    Total gym squat  Heel raises  R leg mini squats 35  20  10  X  X  x Level 8   rockerboard forward/back, side/side 30 s/s  15 f/b  X  x   SL R   Heel raises B on foam 20      Nautilus leg press 50# 2 x 10   Progressed weight 1/5   3 way hip on foam  20  x B, one UE for support   Standing TKE  15 5  With orange tband   Step up forward and lat  10-15  x 6 inch   R SLS in slight knee flexion with 3 way toe tap 5  x Difficult, increased pain   2 inch eccentric step lower, RCC 4   painful     Specific Interventions Next Treatment:  R ACL protocol without meniscus repair, No restrictions with AROM    Activity/Treatment Tolerance:  []  Patient tolerated treatment well  [x]  Patient limited by fatigue  [x]  Patient limited by pain   []  Patient limited by medical complications  []  Other:     Assessment: Did not progress reps for exercises this date d/t mm fatigue and soreness. Pt was able to complete 6 inch lateral step ups and three tap SLS with min pain. Pt reports not doing HEP much, and is evident with increased fatigue and limited endurance at end of session. GOALS:  Patient Goal: to get my knee back to normal    Short Term Goals:  Time Frame: deferred to LTG's    Long Term Goals:  Time Frame: 5 weeks  1. Increase AROM R hip flexion to 80, knee 0- 135 degrees to allow patient to report able to walk with normal gait pattern with no pain. 2. Increase strength R LE to 4/5 to allow patient to go up and down steps reciprocally with no pain. 3. I with HEP as prescribed to allow patient to walk x 30 minutes at grocery with no R knee pain. Patient Education:   []  HEP/Education Completed: ice, monitor response to progressions  []  No new Education completed  [x]  Reviewed Prior HEP      [x]  Patient verbalized and/or demonstrated understanding of education provided.   []  Patient unable to verbalize and/or demonstrate understanding of education provided. Will continue education. []  Barriers to learning: none    PLAN:  Treatment Recommendations: Strengthening, Range of Motion, Balance Training, Gait Training, Stair Training, Home Exercise Program and Patient Education   Plan to see patient 2 times per week for 5 weeks to address the treatment planned outlined above.   [x]  Continue with current plan of care  []  Modify plan of care as follows:    []  Hold pending physician visit  []  Discharge    Time In 1245   Time Out 1315   Timed Code Minutes: 30 min   Total Treatment Time:    30  min       Electronically Signed by: Denisha Pate PTA

## 2022-01-17 ENCOUNTER — HOSPITAL ENCOUNTER (OUTPATIENT)
Dept: PHYSICAL THERAPY | Age: 32
Setting detail: THERAPIES SERIES
Discharge: HOME | End: 2022-01-17
Payer: COMMERCIAL

## 2022-01-17 PROCEDURE — 97110 THERAPEUTIC EXERCISES: CPT

## 2022-01-19 ENCOUNTER — HOSPITAL ENCOUNTER (OUTPATIENT)
Dept: PHYSICAL THERAPY | Age: 32
Setting detail: THERAPIES SERIES
End: 2022-01-19
Payer: COMMERCIAL

## 2022-02-01 NOTE — DISCHARGE SUMMARY
[facility] NOTE  OUTPATIENT  [address] [facility].    Patient Name: Kvng Pinto        CSN: [id number]   YOB: 1990  Gender: female  No ref. provider found,    No admission diagnoses are documented for this encounter. ,      Patient is discharged from Physical Therapy services at this time. See last note for details related to results of therapy and goal achievement. Reason for discharge: Pt cancelled re-assessment on 1/19/22. Pt hasn't called to reschedule but staff has attempted to contact patient at least 3 times and was unable to reach pt or leave a message. Pt will need new order/C9 to resume PT.       Atiya Pa DPT, #[id number]

## 2022-06-09 ENCOUNTER — OFFICE VISIT (OUTPATIENT)
Dept: FAMILY MEDICINE CLINIC | Age: 32
End: 2022-06-09
Payer: COMMERCIAL

## 2022-06-09 VITALS
BODY MASS INDEX: 44.16 KG/M2 | RESPIRATION RATE: 16 BRPM | TEMPERATURE: 97.5 F | DIASTOLIC BLOOD PRESSURE: 78 MMHG | HEART RATE: 60 BPM | SYSTOLIC BLOOD PRESSURE: 130 MMHG | WEIGHT: 273.6 LBS

## 2022-06-09 DIAGNOSIS — Z13.31 POSITIVE DEPRESSION SCREENING: ICD-10-CM

## 2022-06-09 DIAGNOSIS — L23.9 ALLERGIC DERMATITIS: Primary | ICD-10-CM

## 2022-06-09 DIAGNOSIS — R41.840 LACK OF CONCENTRATION: ICD-10-CM

## 2022-06-09 PROCEDURE — 99214 OFFICE O/P EST MOD 30 MIN: CPT | Performed by: FAMILY MEDICINE

## 2022-06-09 SDOH — ECONOMIC STABILITY: FOOD INSECURITY: WITHIN THE PAST 12 MONTHS, THE FOOD YOU BOUGHT JUST DIDN'T LAST AND YOU DIDN'T HAVE MONEY TO GET MORE.: NEVER TRUE

## 2022-06-09 SDOH — ECONOMIC STABILITY: FOOD INSECURITY: WITHIN THE PAST 12 MONTHS, YOU WORRIED THAT YOUR FOOD WOULD RUN OUT BEFORE YOU GOT MONEY TO BUY MORE.: NEVER TRUE

## 2022-06-09 ASSESSMENT — PATIENT HEALTH QUESTIONNAIRE - PHQ9
SUM OF ALL RESPONSES TO PHQ9 QUESTIONS 1 & 2: 6
2. FEELING DOWN, DEPRESSED OR HOPELESS: 3
5. POOR APPETITE OR OVEREATING: 1
8. MOVING OR SPEAKING SO SLOWLY THAT OTHER PEOPLE COULD HAVE NOTICED. OR THE OPPOSITE, BEING SO FIGETY OR RESTLESS THAT YOU HAVE BEEN MOVING AROUND A LOT MORE THAN USUAL: 1
SUM OF ALL RESPONSES TO PHQ QUESTIONS 1-9: 19
6. FEELING BAD ABOUT YOURSELF - OR THAT YOU ARE A FAILURE OR HAVE LET YOURSELF OR YOUR FAMILY DOWN: 3
1. LITTLE INTEREST OR PLEASURE IN DOING THINGS: 3
SUM OF ALL RESPONSES TO PHQ QUESTIONS 1-9: 19
9. THOUGHTS THAT YOU WOULD BE BETTER OFF DEAD, OR OF HURTING YOURSELF: 0
4. FEELING TIRED OR HAVING LITTLE ENERGY: 3
SUM OF ALL RESPONSES TO PHQ QUESTIONS 1-9: 19
7. TROUBLE CONCENTRATING ON THINGS, SUCH AS READING THE NEWSPAPER OR WATCHING TELEVISION: 2
3. TROUBLE FALLING OR STAYING ASLEEP: 3
10. IF YOU CHECKED OFF ANY PROBLEMS, HOW DIFFICULT HAVE THESE PROBLEMS MADE IT FOR YOU TO DO YOUR WORK, TAKE CARE OF THINGS AT HOME, OR GET ALONG WITH OTHER PEOPLE: 3
SUM OF ALL RESPONSES TO PHQ QUESTIONS 1-9: 19

## 2022-06-09 ASSESSMENT — ENCOUNTER SYMPTOMS: RESPIRATORY NEGATIVE: 1

## 2022-06-09 ASSESSMENT — SOCIAL DETERMINANTS OF HEALTH (SDOH): HOW HARD IS IT FOR YOU TO PAY FOR THE VERY BASICS LIKE FOOD, HOUSING, MEDICAL CARE, AND HEATING?: NOT HARD AT ALL

## 2024-10-06 ENCOUNTER — HOSPITAL ENCOUNTER (EMERGENCY)
Age: 34
Discharge: HOME | End: 2024-10-06
Attending: EMERGENCY MEDICINE

## 2024-10-06 ENCOUNTER — APPOINTMENT (OUTPATIENT)
Dept: GENERAL RADIOLOGY | Age: 34
End: 2024-10-06

## 2024-10-06 VITALS
HEIGHT: 66 IN | RESPIRATION RATE: 15 BRPM | OXYGEN SATURATION: 96 % | TEMPERATURE: 99.2 F | HEART RATE: 73 BPM | SYSTOLIC BLOOD PRESSURE: 132 MMHG | DIASTOLIC BLOOD PRESSURE: 83 MMHG | WEIGHT: 231 LBS | BODY MASS INDEX: 37.12 KG/M2

## 2024-10-06 DIAGNOSIS — R00.2 PALPITATIONS: ICD-10-CM

## 2024-10-06 DIAGNOSIS — R07.89 ATYPICAL CHEST PAIN: Primary | ICD-10-CM

## 2024-10-06 LAB
ALBUMIN SERPL BCG-MCNC: 4.5 G/DL (ref 3.5–5.1)
ALP SERPL-CCNC: 51 U/L (ref 38–126)
ALT SERPL W/O P-5'-P-CCNC: 18 U/L (ref 11–66)
ANION GAP SERPL CALC-SCNC: 13 MEQ/L (ref 8–16)
AST SERPL-CCNC: 16 U/L (ref 5–40)
BASOPHILS ABSOLUTE: 0.1 THOU/MM3 (ref 0–0.1)
BASOPHILS NFR BLD AUTO: 0.3 %
BILIRUB SERPL-MCNC: 0.2 MG/DL (ref 0.3–1.2)
BUN SERPL-MCNC: 12 MG/DL (ref 7–22)
CALCIUM SERPL-MCNC: 9.4 MG/DL (ref 8.5–10.5)
CHLORIDE SERPL-SCNC: 99 MEQ/L (ref 98–111)
CO2 SERPL-SCNC: 23 MEQ/L (ref 23–33)
CREAT SERPL-MCNC: 0.9 MG/DL (ref 0.4–1.2)
DEPRECATED RDW RBC AUTO: 38 FL (ref 35–45)
EKG ATRIAL RATE: 94 BPM
EKG P AXIS: 58 DEGREES
EKG P-R INTERVAL: 126 MS
EKG Q-T INTERVAL: 342 MS
EKG QRS DURATION: 82 MS
EKG QTC CALCULATION (BAZETT): 427 MS
EKG R AXIS: 26 DEGREES
EKG T AXIS: 45 DEGREES
EKG VENTRICULAR RATE: 94 BPM
EOSINOPHIL NFR BLD AUTO: 0.1 %
EOSINOPHILS ABSOLUTE: 0 THOU/MM3 (ref 0–0.4)
ERYTHROCYTE [DISTWIDTH] IN BLOOD BY AUTOMATED COUNT: 12.4 % (ref 11.5–14.5)
FLUAV RNA RESP QL NAA+PROBE: NOT DETECTED
FLUBV RNA RESP QL NAA+PROBE: NOT DETECTED
GFR SERPL CREATININE-BSD FRML MDRD: 86 ML/MIN/1.73M2
GLUCOSE SERPL-MCNC: 115 MG/DL (ref 70–108)
HCT VFR BLD AUTO: 46.1 % (ref 37–47)
HGB BLD-MCNC: 15.7 GM/DL (ref 12–16)
IMM GRANULOCYTES # BLD AUTO: 0.06 THOU/MM3 (ref 0–0.07)
IMM GRANULOCYTES NFR BLD AUTO: 0.4 %
LYMPHOCYTES ABSOLUTE: 1.4 THOU/MM3 (ref 1–4.8)
LYMPHOCYTES NFR BLD AUTO: 8.3 %
MAGNESIUM SERPL-MCNC: 2 MG/DL (ref 1.6–2.4)
MCH RBC QN AUTO: 29.1 PG (ref 26–33)
MCHC RBC AUTO-ENTMCNC: 34.1 GM/DL (ref 32.2–35.5)
MCV RBC AUTO: 85.4 FL (ref 81–99)
MONOCYTES ABSOLUTE: 1 THOU/MM3 (ref 0.4–1.3)
MONOCYTES NFR BLD AUTO: 5.8 %
NEUTROPHILS ABSOLUTE: 14.2 THOU/MM3 (ref 1.8–7.7)
NEUTROPHILS NFR BLD AUTO: 85.1 %
NRBC BLD AUTO-RTO: 0 /100 WBC
OSMOLALITY SERPL CALC.SUM OF ELEC: 270.8 MOSMOL/KG (ref 275–300)
PLATELET # BLD AUTO: 363 THOU/MM3 (ref 130–400)
PMV BLD AUTO: 9.5 FL (ref 9.4–12.4)
POTASSIUM SERPL-SCNC: 3.8 MEQ/L (ref 3.5–5.2)
PROT SERPL-MCNC: 7.4 G/DL (ref 6.1–8)
RBC # BLD AUTO: 5.4 MILL/MM3 (ref 4.2–5.4)
SARS-COV-2 RNA RESP QL NAA+PROBE: NOT DETECTED
SODIUM SERPL-SCNC: 135 MEQ/L (ref 135–145)
T4 FREE SERPL-MCNC: 1.33 NG/DL (ref 0.93–1.68)
TROPONIN, HIGH SENSITIVITY: 7 NG/L (ref 0–12)
TROPONIN, HIGH SENSITIVITY: 7 NG/L (ref 0–12)
TSH SERPL DL<=0.005 MIU/L-ACNC: 2 UIU/ML (ref 0.4–4.2)
WBC # BLD AUTO: 16.7 THOU/MM3 (ref 4.8–10.8)

## 2024-10-06 PROCEDURE — 83735 ASSAY OF MAGNESIUM: CPT

## 2024-10-06 PROCEDURE — 99285 EMERGENCY DEPT VISIT HI MDM: CPT

## 2024-10-06 PROCEDURE — 71045 X-RAY EXAM CHEST 1 VIEW: CPT

## 2024-10-06 PROCEDURE — 84484 ASSAY OF TROPONIN QUANT: CPT

## 2024-10-06 PROCEDURE — 93005 ELECTROCARDIOGRAM TRACING: CPT | Performed by: EMERGENCY MEDICINE

## 2024-10-06 PROCEDURE — 87636 SARSCOV2 & INF A&B AMP PRB: CPT

## 2024-10-06 PROCEDURE — 6370000000 HC RX 637 (ALT 250 FOR IP): Performed by: EMERGENCY MEDICINE

## 2024-10-06 PROCEDURE — 96374 THER/PROPH/DIAG INJ IV PUSH: CPT

## 2024-10-06 PROCEDURE — 84439 ASSAY OF FREE THYROXINE: CPT

## 2024-10-06 PROCEDURE — 80053 COMPREHEN METABOLIC PANEL: CPT

## 2024-10-06 PROCEDURE — 93010 ELECTROCARDIOGRAM REPORT: CPT | Performed by: INTERNAL MEDICINE

## 2024-10-06 PROCEDURE — 36415 COLL VENOUS BLD VENIPUNCTURE: CPT

## 2024-10-06 PROCEDURE — 6360000002 HC RX W HCPCS: Performed by: EMERGENCY MEDICINE

## 2024-10-06 PROCEDURE — 84443 ASSAY THYROID STIM HORMONE: CPT

## 2024-10-06 PROCEDURE — 85025 COMPLETE CBC W/AUTO DIFF WBC: CPT

## 2024-10-06 RX ORDER — KETOROLAC TROMETHAMINE 30 MG/ML
15 INJECTION, SOLUTION INTRAMUSCULAR; INTRAVENOUS ONCE
Status: COMPLETED | OUTPATIENT
Start: 2024-10-06 | End: 2024-10-06

## 2024-10-06 RX ADMIN — KETOROLAC TROMETHAMINE 15 MG: 30 INJECTION, SOLUTION INTRAMUSCULAR at 01:14

## 2024-10-06 RX ADMIN — LIDOCAINE HYDROCHLORIDE: 20 SOLUTION ORAL at 04:13

## 2024-10-06 ASSESSMENT — PAIN - FUNCTIONAL ASSESSMENT: PAIN_FUNCTIONAL_ASSESSMENT: NONE - DENIES PAIN

## 2024-10-06 NOTE — DISCHARGE INSTRUCTIONS
You were seen for chest pain.  No evidence of infection, ischemia, or obstructive process was found on evaluation today.  You may try taking Tylenol or Motrin as needed for pain.  You can also try Prilosec or Pepcid for acid reducing medications.  Sometimes esophageal spasm and GERD can cause significant chest pain.  Follow-up with your primary care physician as needed.  If you continue to have persistent chest pain, develop fever, have difficulty breathing, or any other concerning symptoms please return to emergency room for evaluation.

## 2024-10-06 NOTE — ED TRIAGE NOTES
Patient ambulates into the ED for evaluation of chest pain since around 2230. She states she ate a THC gummy around 2100. Patient reports hx of panic attacks and states she feels anxious. She states she has intermittent pain to her left chest and feels her HR increase occasionally. Patient resting in bed with unlabored respirations. EKG complete. VSS.

## 2024-10-06 NOTE — ED PROVIDER NOTES
S1 normal and S2 normal. Heart sounds not distant. No murmur heard.     No friction rub.   Pulmonary:      Effort: Pulmonary effort is normal. No respiratory distress.      Breath sounds: Normal breath sounds. No stridor. No wheezing or rales.   Chest:      Chest wall: No tenderness.   Abdominal:      General: Abdomen is flat. There is no distension.      Palpations: Abdomen is soft.      Tenderness: There is no abdominal tenderness. There is no guarding or rebound.   Musculoskeletal:         General: Normal range of motion.      Cervical back: Neck supple.      Right lower leg: No edema.      Left lower leg: No edema.   Skin:     General: Skin is warm and dry.      Capillary Refill: Capillary refill takes less than 2 seconds.   Neurological:      General: No focal deficit present.      Mental Status: She is alert and oriented to person, place, and time.         FORMAL DIAGNOSTIC RESULTS     RADIOLOGY: Interpretation per the Radiologist below, if available at the time of this note (none if blank):    XR CHEST PORTABLE   Final Result   No acute findings.      This document has been electronically signed by: Prakash aBrrett MD on    10/06/2024 02:04 AM          LABS: (none if blank)  Labs Reviewed   CBC WITH AUTO DIFFERENTIAL - Abnormal; Notable for the following components:       Result Value    WBC 16.7 (*)     Neutrophils Absolute 14.2 (*)     All other components within normal limits   COMPREHENSIVE METABOLIC PANEL - Abnormal; Notable for the following components:    Glucose 115 (*)     Total Bilirubin 0.2 (*)     All other components within normal limits   OSMOLALITY - Abnormal; Notable for the following components:    Osmolality Calc 270.8 (*)     All other components within normal limits   COVID-19 & INFLUENZA COMBO   TROPONIN   TROPONIN   TSH   T4, FREE   MAGNESIUM   ANION GAP   GLOMERULAR FILTRATION RATE, ESTIMATED       (Any cultures that may have been sent were not resulted at the time of this patient

## 2024-10-06 NOTE — DISCHARGE INSTR - COC
Continuity of Care Form    Patient Name: Alexia New   :  1990  MRN:  [id number]    Admit date:  10/6/2024  Discharge date:  ***    Code Status Order: No Order   Advance Directives:   Advance Care Flowsheet Documentation             Admitting Physician:  No admitting provider for patient encounter.  PCP: Tamanna Tucker MD    Discharging Nurse: ***  Discharging Hospital Unit/Room#: 15/015A  Discharging Unit Phone Number: ***    Emergency Contact:   Extended Emergency Contact Information  Primary Emergency Contact: GWEN NEW  Mobile Phone: [phone]  Relation: Brother/Sister   needed? No  Secondary Emergency Contact: Angela New   [facility]  Home Phone: [phone]  Mobile Phone: [phone]  Relation: Parent    Past Surgical History:  Past Surgical History:   Procedure Laterality Date    KNEE ARTHROSCOPY Right 2021    Has had 2 surgeries most recent was 2021.    WISDOM TOOTH EXTRACTION         Immunization History:   Immunization History   Administered Date(s) Administered    COVID-19, MODERNA BLUE border, Primary or Immunocompromised, (age 12y+), IM, 100 mcg/0.5mL 2021, 10/23/2021    DTP 1991, 1991, 1991, 1992    DTaP vaccine 1995    Hep B, ENGERIX-B, RECOMBIVAX-HB, (age Birth - 19y), IM, 0.5mL 1998, 1998, 10/20/1999    Hib vaccine 1991, 1991, 1991, 1992    Influenza Virus Vaccine 10/20/1997, 2003, 10/27/2004, 2005, 2006    Influenza Whole 1998    MMR, PRIORIX, M-M-R II, (age 12m+), SC, 0.5mL 1992, 1998    Polio OPV 1991, 1991, 1992, 1995    Td vaccine (adult) 2005       Active Problems:  Patient Active Problem List   Diagnosis Code    Insulin resistance E88.819    Morbid obesity E66.01       Isolation/Infection:   Isolation            No Isolation          Patient Infection Status       None to display

## 2024-10-09 ENCOUNTER — OFFICE VISIT (OUTPATIENT)
Dept: FAMILY MEDICINE CLINIC | Age: 34
End: 2024-10-09

## 2024-10-09 VITALS
HEART RATE: 68 BPM | BODY MASS INDEX: 37.35 KG/M2 | WEIGHT: 231.4 LBS | TEMPERATURE: 98.3 F | DIASTOLIC BLOOD PRESSURE: 86 MMHG | SYSTOLIC BLOOD PRESSURE: 124 MMHG | RESPIRATION RATE: 12 BRPM

## 2024-10-09 DIAGNOSIS — F41.1 GENERALIZED ANXIETY DISORDER: ICD-10-CM

## 2024-10-09 DIAGNOSIS — F33.2 SEVERE EPISODE OF RECURRENT MAJOR DEPRESSIVE DISORDER, WITHOUT PSYCHOTIC FEATURES (HCC): Primary | ICD-10-CM

## 2024-10-09 PROCEDURE — 99214 OFFICE O/P EST MOD 30 MIN: CPT | Performed by: STUDENT IN AN ORGANIZED HEALTH CARE EDUCATION/TRAINING PROGRAM

## 2024-10-09 RX ORDER — HYDROXYZINE HYDROCHLORIDE 10 MG/1
10 TABLET, FILM COATED ORAL 3 TIMES DAILY PRN
Qty: 60 TABLET | Refills: 0 | Status: SHIPPED | OUTPATIENT
Start: 2024-10-09

## 2024-10-09 RX ORDER — ESCITALOPRAM OXALATE 10 MG/1
10 TABLET ORAL DAILY
Qty: 30 TABLET | Refills: 3 | Status: SHIPPED | OUTPATIENT
Start: 2024-10-09

## 2024-10-09 SDOH — ECONOMIC STABILITY: FOOD INSECURITY: WITHIN THE PAST 12 MONTHS, YOU WORRIED THAT YOUR FOOD WOULD RUN OUT BEFORE YOU GOT MONEY TO BUY MORE.: NEVER TRUE

## 2024-10-09 SDOH — ECONOMIC STABILITY: INCOME INSECURITY: HOW HARD IS IT FOR YOU TO PAY FOR THE VERY BASICS LIKE FOOD, HOUSING, MEDICAL CARE, AND HEATING?: NOT HARD AT ALL

## 2024-10-09 SDOH — ECONOMIC STABILITY: FOOD INSECURITY: WITHIN THE PAST 12 MONTHS, THE FOOD YOU BOUGHT JUST DIDN'T LAST AND YOU DIDN'T HAVE MONEY TO GET MORE.: NEVER TRUE

## 2024-10-09 ASSESSMENT — PATIENT HEALTH QUESTIONNAIRE - PHQ9
9. THOUGHTS THAT YOU WOULD BE BETTER OFF DEAD, OR OF HURTING YOURSELF: SEVERAL DAYS
8. MOVING OR SPEAKING SO SLOWLY THAT OTHER PEOPLE COULD HAVE NOTICED. OR THE OPPOSITE, BEING SO FIGETY OR RESTLESS THAT YOU HAVE BEEN MOVING AROUND A LOT MORE THAN USUAL: SEVERAL DAYS
SUM OF ALL RESPONSES TO PHQ QUESTIONS 1-9: 11
7. TROUBLE CONCENTRATING ON THINGS, SUCH AS READING THE NEWSPAPER OR WATCHING TELEVISION: SEVERAL DAYS
SUM OF ALL RESPONSES TO PHQ QUESTIONS 1-9: 12
SUM OF ALL RESPONSES TO PHQ QUESTIONS 1-9: 12
6. FEELING BAD ABOUT YOURSELF - OR THAT YOU ARE A FAILURE OR HAVE LET YOURSELF OR YOUR FAMILY DOWN: SEVERAL DAYS
2. FEELING DOWN, DEPRESSED OR HOPELESS: NEARLY EVERY DAY
SUM OF ALL RESPONSES TO PHQ QUESTIONS 1-9: 12
4. FEELING TIRED OR HAVING LITTLE ENERGY: SEVERAL DAYS
3. TROUBLE FALLING OR STAYING ASLEEP: SEVERAL DAYS
5. POOR APPETITE OR OVEREATING: NOT AT ALL
SUM OF ALL RESPONSES TO PHQ9 QUESTIONS 1 & 2: 6
10. IF YOU CHECKED OFF ANY PROBLEMS, HOW DIFFICULT HAVE THESE PROBLEMS MADE IT FOR YOU TO DO YOUR WORK, TAKE CARE OF THINGS AT HOME, OR GET ALONG WITH OTHER PEOPLE: SOMEWHAT DIFFICULT

## 2024-10-09 ASSESSMENT — ENCOUNTER SYMPTOMS
DIARRHEA: 0
NAUSEA: 0
VOMITING: 0
CONSTIPATION: 0
ABDOMINAL PAIN: 0

## 2024-10-09 ASSESSMENT — COLUMBIA-SUICIDE SEVERITY RATING SCALE - C-SSRS
1. WITHIN THE PAST MONTH, HAVE YOU WISHED YOU WERE DEAD OR WISHED YOU COULD GO TO SLEEP AND NOT WAKE UP?: YES
2. HAVE YOU ACTUALLY HAD ANY THOUGHTS OF KILLING YOURSELF?: NO
6. HAVE YOU EVER DONE ANYTHING, STARTED TO DO ANYTHING, OR PREPARED TO DO ANYTHING TO END YOUR LIFE?: NO

## 2024-10-09 ASSESSMENT — HEART SCORE: ECG: NORMAL

## 2024-10-09 NOTE — PROGRESS NOTES
Alexia New is a 33 y.o. female who presents today for:  Chief Complaint   Patient presents with    Anxiety     Pt wants to discuss anxiety, was in the hospital over the weekend          HPI:   Alexia New is 33 y.o. who presents today for anxiety and depression.  Patient states she has a long history of anxiety and depression from being a child.  Feels her anxiety has worsened over the past couple of months.  She was seen in the ED due to ongoing chest pain, left arm tingling, and palpitations over the weekend.  Complete workup including EKG, chest x-ray, CBC, CMP, troponin, thyroid, COVID, influenza were all negative.    Patient states her anxiety has been worsening, and she has been having feelings of heart racing, and panic.  She will also get periods of anger and frustration.  States she has been under more stressors with an upcoming wedding, and trying to plan moving.  She also reports ongoing depression, states this has also been worse recently.  She has little interest in doing things she enjoys and has been feeling more down.  She is also had trouble concentrating and feeling bad about herself.  Reports she is overall sleeping okay, does not typically have difficulty falling asleep or staying asleep.  She has had thoughts that she would be better off dead.  Patient does have a history of prior suicidal ideation with attempt by overdosing on Vicodin and by cutting herself approximately 10 years ago.  She denies any current suicidal ideation or plan.  She has tried to see counselors in the past, but was not able to establish a strong relationship with them.  She has been on BuSpar in the past for anxiety, states this made her anxiety worse.  Per chart review, patient was also started on Zoloft, but does not remember taking this medication.  Patient would like to proceed with medication management at this      Objective:     Vitals:    10/09/24 0915   BP: 124/86   Pulse: 68   Resp: 12   Temp: 98.3

## 2024-11-05 ENCOUNTER — OFFICE VISIT (OUTPATIENT)
Dept: FAMILY MEDICINE CLINIC | Age: 34
End: 2024-11-05

## 2024-11-05 VITALS
WEIGHT: 228 LBS | TEMPERATURE: 98.2 F | RESPIRATION RATE: 16 BRPM | SYSTOLIC BLOOD PRESSURE: 124 MMHG | DIASTOLIC BLOOD PRESSURE: 86 MMHG | BODY MASS INDEX: 36.8 KG/M2 | HEART RATE: 88 BPM

## 2024-11-05 DIAGNOSIS — F33.2 SEVERE EPISODE OF RECURRENT MAJOR DEPRESSIVE DISORDER, WITHOUT PSYCHOTIC FEATURES (HCC): Primary | ICD-10-CM

## 2024-11-05 DIAGNOSIS — F41.1 GENERALIZED ANXIETY DISORDER: ICD-10-CM

## 2024-11-05 PROCEDURE — 99214 OFFICE O/P EST MOD 30 MIN: CPT | Performed by: STUDENT IN AN ORGANIZED HEALTH CARE EDUCATION/TRAINING PROGRAM

## 2024-11-05 ASSESSMENT — ENCOUNTER SYMPTOMS
NAUSEA: 0
VOMITING: 0
CONSTIPATION: 0
DIARRHEA: 0
BACK PAIN: 0

## 2024-11-05 NOTE — PROGRESS NOTES
found for: \"HDL\"  No components found for: \"LDLCHOLESTEROL\", \"LDLCALC\"    No results found for: \"[id number]\"    Review of Systems   Constitutional:  Negative for activity change, chills, fatigue and fever.   Cardiovascular:  Negative for chest pain and palpitations.   Gastrointestinal:  Negative for constipation, diarrhea, nausea and vomiting.   Musculoskeletal:  Negative for back pain.   Neurological:  Negative for dizziness, syncope, light-headedness and headaches.   Psychiatric/Behavioral:  Negative for dysphoric mood and sleep disturbance. The patient is not nervous/anxious.        Physical Exam  Vitals and nursing note reviewed.   Constitutional:       Appearance: Normal appearance. She is obese.   HENT:      Head: Normocephalic and atraumatic.      Right Ear: External ear normal.      Left Ear: External ear normal.      Nose: Nose normal.      Mouth/Throat:      Mouth: Mucous membranes are moist.   Eyes:      Extraocular Movements: Extraocular movements intact.      Conjunctiva/sclera: Conjunctivae normal.      Pupils: Pupils are equal, round, and reactive to light.   Cardiovascular:      Rate and Rhythm: Normal rate and regular rhythm.      Pulses: Normal pulses.      Heart sounds: No murmur heard.  Pulmonary:      Effort: Pulmonary effort is normal. No respiratory distress.      Breath sounds: Normal breath sounds. No wheezing.   Abdominal:      General: Bowel sounds are normal. There is no distension.      Palpations: Abdomen is soft. There is no mass.      Tenderness: There is no abdominal tenderness.   Musculoskeletal:      Cervical back: Neck supple.   Skin:     General: Skin is warm and dry.   Neurological:      General: No focal deficit present.      Mental Status: She is alert and oriented to person, place, and time.   Psychiatric:         Mood and Affect: Mood normal.         Behavior: Behavior normal.         Immunization History   Administered Date(s) Administered    COVID-19, MODERNA BLUE border,

## 2024-12-17 ENCOUNTER — OFFICE VISIT (OUTPATIENT)
Dept: FAMILY MEDICINE CLINIC | Age: 34
End: 2024-12-17

## 2024-12-17 VITALS
OXYGEN SATURATION: 98 % | TEMPERATURE: 98.7 F | HEIGHT: 66 IN | RESPIRATION RATE: 16 BRPM | HEART RATE: 82 BPM | BODY MASS INDEX: 36 KG/M2 | SYSTOLIC BLOOD PRESSURE: 126 MMHG | WEIGHT: 224 LBS | DIASTOLIC BLOOD PRESSURE: 82 MMHG

## 2024-12-17 DIAGNOSIS — F33.2 SEVERE EPISODE OF RECURRENT MAJOR DEPRESSIVE DISORDER, WITHOUT PSYCHOTIC FEATURES (HCC): ICD-10-CM

## 2024-12-17 DIAGNOSIS — F41.1 GENERALIZED ANXIETY DISORDER: Primary | ICD-10-CM

## 2024-12-17 PROCEDURE — 99213 OFFICE O/P EST LOW 20 MIN: CPT | Performed by: FAMILY MEDICINE

## 2024-12-17 RX ORDER — HYDROXYZINE HYDROCHLORIDE 10 MG/1
10 TABLET, FILM COATED ORAL 3 TIMES DAILY PRN
Qty: 60 TABLET | Refills: 0 | Status: SHIPPED | OUTPATIENT
Start: 2024-12-17

## 2024-12-17 RX ORDER — ESCITALOPRAM OXALATE 10 MG/1
15 TABLET ORAL DAILY
Qty: 45 TABLET | Refills: 5 | Status: SHIPPED | OUTPATIENT
Start: 2024-12-17

## 2024-12-17 ASSESSMENT — ENCOUNTER SYMPTOMS
GASTROINTESTINAL NEGATIVE: 1
RESPIRATORY NEGATIVE: 1

## 2024-12-17 NOTE — PROGRESS NOTES
2024    Alexia New (:  1990) is a 34 y.o. female, Established patient, here for evaluation of the following chief complaint(s):  Discuss Medications (Doing well on the lexapro, she would like to have something anxiety. Hydroxyzine does help but makes her sleepy, sometimes it takes up to 2 hours. ) and OTHER (Patient is moving out of state but no for sure on the date. Questioning how many refills. )      ASSESSMENT/PLAN:    1. Generalized anxiety disorder  -     escitalopram (LEXAPRO) 10 MG tablet; Take 1.5 tablets by mouth daily, Disp-45 tablet, R-5Normal  -     hydrOXYzine HCl (ATARAX) 10 MG tablet; Take 1 tablet by mouth 3 times daily as needed for Anxiety, Disp-60 tablet, R-0Normal  2. Severe episode of recurrent major depressive disorder, without psychotic features (HCC)  -     escitalopram (LEXAPRO) 10 MG tablet; Take 1.5 tablets by mouth daily, Disp-45 tablet, R-5Normal    After a discussion about various options for treating her anxiety, we opted to increase her Lexapro to 10 mg 1-1/2 tablets daily for better control of anxiety and depression.  These are chronic conditions that are improving.    Patient may continue to use hydroxyzine 10 mg one half or 1 tablet up to 3 times daily as needed for acute anxiety.    Good self-care encouraged to include healthy diet, regular exercise, and adequate rest    Return in about 6 weeks (around 2025) for Follow up.    SUBJECTIVE/OBJECTIVE:    HPI    Patient here today for follow-up of anxiety and depression.  At her last visit, she was started on Lexapro 10 mg daily along with hydroxyzine 10 mg 3 times daily as needed.  Patient feels that the Lexapro has significantly helped her depression.  She continues with some anxiety symptoms and panic on an intermittent basis.  She denies any specific triggers.  She feels that hydroxyzine helps when used as needed for acute anxiety but that the 10 mg dose makes her tired.  Patient denies any

## 2025-01-27 SDOH — ECONOMIC STABILITY: INCOME INSECURITY: IN THE LAST 12 MONTHS, WAS THERE A TIME WHEN YOU WERE NOT ABLE TO PAY THE MORTGAGE OR RENT ON TIME?: NO

## 2025-01-27 SDOH — ECONOMIC STABILITY: TRANSPORTATION INSECURITY
IN THE PAST 12 MONTHS, HAS THE LACK OF TRANSPORTATION KEPT YOU FROM MEDICAL APPOINTMENTS OR FROM GETTING MEDICATIONS?: NO

## 2025-01-27 SDOH — ECONOMIC STABILITY: FOOD INSECURITY: WITHIN THE PAST 12 MONTHS, THE FOOD YOU BOUGHT JUST DIDN'T LAST AND YOU DIDN'T HAVE MONEY TO GET MORE.: SOMETIMES TRUE

## 2025-01-27 SDOH — ECONOMIC STABILITY: TRANSPORTATION INSECURITY
IN THE PAST 12 MONTHS, HAS LACK OF TRANSPORTATION KEPT YOU FROM MEETINGS, WORK, OR FROM GETTING THINGS NEEDED FOR DAILY LIVING?: NO

## 2025-01-27 SDOH — ECONOMIC STABILITY: FOOD INSECURITY: WITHIN THE PAST 12 MONTHS, YOU WORRIED THAT YOUR FOOD WOULD RUN OUT BEFORE YOU GOT MONEY TO BUY MORE.: SOMETIMES TRUE

## 2025-01-27 ASSESSMENT — PATIENT HEALTH QUESTIONNAIRE - PHQ9
SUM OF ALL RESPONSES TO PHQ QUESTIONS 1-9: 6
8. MOVING OR SPEAKING SO SLOWLY THAT OTHER PEOPLE COULD HAVE NOTICED. OR THE OPPOSITE - BEING SO FIDGETY OR RESTLESS THAT YOU HAVE BEEN MOVING AROUND A LOT MORE THAN USUAL: MORE THAN HALF THE DAYS
9. THOUGHTS THAT YOU WOULD BE BETTER OFF DEAD, OR OF HURTING YOURSELF: NOT AT ALL
1. LITTLE INTEREST OR PLEASURE IN DOING THINGS: NOT AT ALL
2. FEELING DOWN, DEPRESSED OR HOPELESS: NOT AT ALL
10. IF YOU CHECKED OFF ANY PROBLEMS, HOW DIFFICULT HAVE THESE PROBLEMS MADE IT FOR YOU TO DO YOUR WORK, TAKE CARE OF THINGS AT HOME, OR GET ALONG WITH OTHER PEOPLE: NOT DIFFICULT AT ALL
9. THOUGHTS THAT YOU WOULD BE BETTER OFF DEAD, OR OF HURTING YOURSELF: NOT AT ALL
8. MOVING OR SPEAKING SO SLOWLY THAT OTHER PEOPLE COULD HAVE NOTICED. OR THE OPPOSITE, BEING SO FIGETY OR RESTLESS THAT YOU HAVE BEEN MOVING AROUND A LOT MORE THAN USUAL: MORE THAN HALF THE DAYS
3. TROUBLE FALLING OR STAYING ASLEEP: MORE THAN HALF THE DAYS
4. FEELING TIRED OR HAVING LITTLE ENERGY: MORE THAN HALF THE DAYS
7. TROUBLE CONCENTRATING ON THINGS, SUCH AS READING THE NEWSPAPER OR WATCHING TELEVISION: NOT AT ALL
1. LITTLE INTEREST OR PLEASURE IN DOING THINGS: NOT AT ALL
7. TROUBLE CONCENTRATING ON THINGS, SUCH AS READING THE NEWSPAPER OR WATCHING TELEVISION: NOT AT ALL
SUM OF ALL RESPONSES TO PHQ QUESTIONS 1-9: 6
10. IF YOU CHECKED OFF ANY PROBLEMS, HOW DIFFICULT HAVE THESE PROBLEMS MADE IT FOR YOU TO DO YOUR WORK, TAKE CARE OF THINGS AT HOME, OR GET ALONG WITH OTHER PEOPLE: NOT DIFFICULT AT ALL
3. TROUBLE FALLING OR STAYING ASLEEP: MORE THAN HALF THE DAYS
SUM OF ALL RESPONSES TO PHQ QUESTIONS 1-9: 6
2. FEELING DOWN, DEPRESSED OR HOPELESS: NOT AT ALL
5. POOR APPETITE OR OVEREATING: NOT AT ALL
4. FEELING TIRED OR HAVING LITTLE ENERGY: MORE THAN HALF THE DAYS
6. FEELING BAD ABOUT YOURSELF - OR THAT YOU ARE A FAILURE OR HAVE LET YOURSELF OR YOUR FAMILY DOWN: NOT AT ALL
6. FEELING BAD ABOUT YOURSELF - OR THAT YOU ARE A FAILURE OR HAVE LET YOURSELF OR YOUR FAMILY DOWN: NOT AT ALL
SUM OF ALL RESPONSES TO PHQ QUESTIONS 1-9: 6
SUM OF ALL RESPONSES TO PHQ9 QUESTIONS 1 & 2: 0
SUM OF ALL RESPONSES TO PHQ QUESTIONS 1-9: 6
5. POOR APPETITE OR OVEREATING: NOT AT ALL

## 2025-01-30 ENCOUNTER — OFFICE VISIT (OUTPATIENT)
Dept: FAMILY MEDICINE CLINIC | Age: 35
End: 2025-01-30

## 2025-01-30 VITALS
DIASTOLIC BLOOD PRESSURE: 84 MMHG | BODY MASS INDEX: 37.41 KG/M2 | SYSTOLIC BLOOD PRESSURE: 114 MMHG | RESPIRATION RATE: 16 BRPM | WEIGHT: 231.8 LBS | TEMPERATURE: 98 F | HEART RATE: 80 BPM

## 2025-01-30 DIAGNOSIS — F33.2 SEVERE EPISODE OF RECURRENT MAJOR DEPRESSIVE DISORDER, WITHOUT PSYCHOTIC FEATURES (HCC): ICD-10-CM

## 2025-01-30 DIAGNOSIS — Z23 NEED FOR TDAP VACCINATION: ICD-10-CM

## 2025-01-30 DIAGNOSIS — F41.1 GENERALIZED ANXIETY DISORDER: Primary | ICD-10-CM

## 2025-01-30 RX ORDER — ESCITALOPRAM OXALATE 10 MG/1
15 TABLET ORAL DAILY
Qty: 135 TABLET | Refills: 3 | Status: SHIPPED | OUTPATIENT
Start: 2025-01-30

## 2025-01-30 ASSESSMENT — ENCOUNTER SYMPTOMS
RESPIRATORY NEGATIVE: 1
GASTROINTESTINAL NEGATIVE: 1

## 2025-01-30 NOTE — PROGRESS NOTES
Immunizations Administered       Name Date Dose Route    TDaP, ADACEL (age 10y-64y), BOOSTRIX (age 10y+), IM, 0.5mL 1/30/2025 0.5 mL Intramuscular    Site: Deltoid- Right    Lot: [id number]    NDC: 61104-508-67        After obtaining consent, and per orders of Dr. Tucker, the above injection was given by KAYLIE CAZARES CMA ([facility]). Patient tolerated well.

## 2025-01-30 NOTE — PROGRESS NOTES
[facility]  [facility]  [address]  [facility] [address]  Dept: [phone]  Loc: [phone]    1/30/2025    Chief Complaint   Patient presents with    Anxiety     Here today for f/up for anxiety. Doing well on Lexapro at current dose.     Elevated pulse     C/O elevated pulse a few times within the past 2-3 weeks, highest was up to 130.    Requesting TDAP          SUBJECTIVE     Alexia New is a 34 y.o.female      History of Present Illness  The patient presents for evaluation of panic attacks, anxiety, and health maintenance.    She reports a decrease in the frequency of her panic attacks, attributing this improvement to an increased dosage of Lexapro. She describes these episodes as self-induced, triggered by sudden spikes in heart rate that she perceives as indicative of an impending panic attack. For instance, she recounts an incident on Tuesday night when her heart rate escalated to 130 while she was engaged in cooking, leading to a panic attack. She is not experiencing any associated symptoms such as chest pain, lightheadedness, or dizziness.. The frequency of her panic attacks has decreased, and she has been able to manage them through breathing exercises.  She has resorted to using hydroxyzine on a few occasions when her panic attacks were particularly severe and unmanageable with breathing exercises. She finds that a half dose of hydroxyzine is effective, although it induces slight fatigue. She notes that her symptoms are more pronounced at night, even though she does not experience significant issues during the day. She suspects that her anxiety may be subconsciously triggered by her current life circumstances, including her fiance's visa status. She is seeking a refill of her Lexapro prescription, with a preference for a 90-day supply, and plans to transfer her prescription to a pharmacy in [place].    She is overdue for her tetanus vaccine and